# Patient Record
Sex: FEMALE | Race: WHITE | NOT HISPANIC OR LATINO | Employment: FULL TIME | URBAN - METROPOLITAN AREA
[De-identification: names, ages, dates, MRNs, and addresses within clinical notes are randomized per-mention and may not be internally consistent; named-entity substitution may affect disease eponyms.]

---

## 2019-10-07 ENCOUNTER — OFFICE VISIT (OUTPATIENT)
Dept: FAMILY MEDICINE CLINIC | Facility: CLINIC | Age: 59
End: 2019-10-07

## 2019-10-07 VITALS
HEIGHT: 64 IN | OXYGEN SATURATION: 97 % | BODY MASS INDEX: 50.02 KG/M2 | WEIGHT: 293 LBS | TEMPERATURE: 97.8 F | SYSTOLIC BLOOD PRESSURE: 120 MMHG | HEART RATE: 90 BPM | DIASTOLIC BLOOD PRESSURE: 84 MMHG | RESPIRATION RATE: 16 BRPM

## 2019-10-07 DIAGNOSIS — Z23 FLU VACCINE NEED: ICD-10-CM

## 2019-10-07 DIAGNOSIS — Z76.89 ENCOUNTER TO ESTABLISH CARE: Primary | ICD-10-CM

## 2019-10-07 DIAGNOSIS — Z00.00 HEALTHCARE MAINTENANCE: ICD-10-CM

## 2019-10-07 DIAGNOSIS — R73.9 HYPERGLYCEMIA: ICD-10-CM

## 2019-10-07 DIAGNOSIS — J30.9 ALLERGIC RHINITIS, UNSPECIFIED SEASONALITY, UNSPECIFIED TRIGGER: ICD-10-CM

## 2019-10-07 DIAGNOSIS — E66.01 MORBIDLY OBESE (HCC): ICD-10-CM

## 2019-10-07 DIAGNOSIS — E55.9 VITAMIN D DEFICIENCY: ICD-10-CM

## 2019-10-07 PROCEDURE — 90471 IMMUNIZATION ADMIN: CPT | Performed by: NURSE PRACTITIONER

## 2019-10-07 PROCEDURE — 90674 CCIIV4 VAC NO PRSV 0.5 ML IM: CPT | Performed by: NURSE PRACTITIONER

## 2019-10-07 PROCEDURE — 99204 OFFICE O/P NEW MOD 45 MIN: CPT | Performed by: NURSE PRACTITIONER

## 2019-10-07 RX ORDER — ERGOCALCIFEROL 1.25 MG/1
CAPSULE ORAL
Refills: 0 | COMMUNITY
Start: 2019-07-08 | End: 2019-10-08 | Stop reason: SDUPTHER

## 2019-10-07 NOTE — PATIENT INSTRUCTIONS
Heart-Healthy Eating Plan  Heart-healthy meal planning includes:  · Eating less unhealthy fats.  · Eating more healthy fats.  · Making other changes in your diet.  Talk with your doctor or a diet specialist (dietitian) to create an eating plan that is right for you.  What is my plan?  Your doctor may recommend an eating plan that includes:  · Total fat: ______% or less of total calories a day.  · Saturated fat: ______% or less of total calories a day.  · Cholesterol: less than _________mg a day.  What are tips for following this plan?  Cooking  Avoid frying your food. Try to bake, boil, grill, or broil it instead. You can also reduce fat by:  · Removing the skin from poultry.  · Removing all visible fats from meats.  · Steaming vegetables in water or broth.  Meal planning    · At meals, divide your plate into four equal parts:  ? Fill one-half of your plate with vegetables and green salads.  ? Fill one-fourth of your plate with whole grains.  ? Fill one-fourth of your plate with lean protein foods.  · Eat 4-5 servings of vegetables per day. A serving of vegetables is:  ? 1 cup of raw or cooked vegetables.  ? 2 cups of raw leafy greens.  · Eat 4-5 servings of fruit per day. A serving of fruit is:  ? 1 medium whole fruit.  ? ¼ cup of dried fruit.  ? ½ cup of fresh, frozen, or canned fruit.  ? ½ cup of 100% fruit juice.  · Eat more foods that have soluble fiber. These are apples, broccoli, carrots, beans, peas, and barley. Try to get 20-30 g of fiber per day.  · Eat 4-5 servings of nuts, legumes, and seeds per week:  ? 1 serving of dried beans or legumes equals ½ cup after being cooked.  ? 1 serving of nuts is ¼ cup.  ? 1 serving of seeds equals 1 tablespoon.  General information  · Eat more home-cooked food. Eat less restaurant, buffet, and fast food.  · Limit or avoid alcohol.  · Limit foods that are high in starch and sugar.  · Avoid fried foods.  · Lose weight if you are overweight.  · Keep track of how much salt  (sodium) you eat. This is important if you have high blood pressure. Ask your doctor to tell you more about this.  · Try to add vegetarian meals each week.  Fats  · Choose healthy fats. These include olive oil and canola oil, flaxseeds, walnuts, almonds, and seeds.  · Eat more omega-3 fats. These include salmon, mackerel, sardines, tuna, flaxseed oil, and ground flaxseeds. Try to eat fish at least 2 times each week.  · Check food labels. Avoid foods with trans fats or high amounts of saturated fat.  · Limit saturated fats.  ? These are often found in animal products, such as meats, butter, and cream.  ? These are also found in plant foods, such as palm oil, palm kernel oil, and coconut oil.  · Avoid foods with partially hydrogenated oils in them. These have trans fats. Examples are stick margarine, some tub margarines, cookies, crackers, and other baked goods.  What foods can I eat?  Fruits  All fresh, canned (in natural juice), or frozen fruits.  Vegetables  Fresh or frozen vegetables (raw, steamed, roasted, or grilled). Green salads.  Grains  Most grains. Choose whole wheat and whole grains most of the time. Rice and pasta, including brown rice and pastas made with whole wheat.  Meats and other proteins  Lean, well-trimmed beef, veal, pork, and lamb. Chicken and turkey without skin. All fish and shellfish. Wild duck, rabbit, pheasant, and venison. Egg whites or low-cholesterol egg substitutes. Dried beans, peas, lentils, and tofu. Seeds and most nuts.  Dairy  Low-fat or nonfat cheeses, including ricotta and mozzarella. Skim or 1% milk that is liquid, powdered, or evaporated. Buttermilk that is made with low-fat milk. Nonfat or low-fat yogurt.  Fats and oils  Non-hydrogenated (trans-free) margarines. Vegetable oils, including soybean, sesame, sunflower, olive, peanut, safflower, corn, canola, and cottonseed. Salad dressings or mayonnaise made with a vegetable oil.  Beverages  Mineral water. Coffee and tea. Diet  carbonated beverages.  Sweets and desserts  Sherbet, gelatin, and fruit ice. Small amounts of dark chocolate.  Limit all sweets and desserts.  Seasonings and condiments  All seasonings and condiments.  The items listed above may not be a complete list of foods and drinks you can eat. Contact a dietitian for more options.  What foods should I avoid?  Fruits  Canned fruit in heavy syrup. Fruit in cream or butter sauce. Fried fruit. Limit coconut.  Vegetables  Vegetables cooked in cheese, cream, or butter sauce. Fried vegetables.  Grains  Breads that are made with saturated or trans fats, oils, or whole milk. Croissants. Sweet rolls. Donuts. High-fat crackers, such as cheese crackers.  Meats and other proteins  Fatty meats, such as hot dogs, ribs, sausage, herring, rib-eye roast or steak. High-fat deli meats, such as salami and bologna. Caviar. Domestic duck and goose. Organ meats, such as liver.  Dairy  Cream, sour cream, cream cheese, and creamed cottage cheese. Whole-milk cheeses. Whole or 2% milk that is liquid, evaporated, or condensed. Whole buttermilk. Cream sauce or high-fat cheese sauce. Yogurt that is made from whole milk.  Fats and oils  Meat fat, or shortening. Cocoa butter, hydrogenated oils, palm oil, coconut oil, palm kernel oil. Solid fats and shortenings, including herring fat, salt pork, lard, and butter. Nondairy cream substitutes. Salad dressings with cheese or sour cream.  Beverages  Regular sodas and juice drinks with added sugar.  Sweets and desserts  Frosting. Pudding. Cookies. Cakes. Pies. Milk chocolate or white chocolate. Buttered syrups. Full-fat ice cream or ice cream drinks.  The items listed above may not be a complete list of foods and drinks to avoid. Contact a dietitian for more information.  Summary  · Heart-healthy meal planning includes eating less unhealthy fats, eating more healthy fats, and making other changes in your diet.  · Eat a balanced diet. This includes fruits and  vegetables, low-fat or nonfat dairy, lean protein, nuts and legumes, whole grains, and heart-healthy oils and fats.  This information is not intended to replace advice given to you by your health care provider. Make sure you discuss any questions you have with your health care provider.  Document Released: 06/18/2013 Document Revised: 01/25/2019 Document Reviewed: 01/25/2019  [employer] Patient Education © 2019 [employer]

## 2019-10-07 NOTE — PROGRESS NOTES
Patient ID: Melissa Hood is a 58 y.o. female     Subjective     Chief Complaint   Patient presents with   • Establish Care   • Cough       History of Present Illness    Melissa Hood presents to the office today for new patient evaluation.  Recently moved here in June from [place].Transferred here for work with [employer].  Has had complete annual physical in January of this year which was stable.  Vitamin D level was 20.7 in January.  Took 12 week course of vitamin D 50,000 units weekly.  Has had a chronic dry cough.  She was seen at the urgent care beginning of September and treated with antibiotics and steroids and condition had improved except for continued nagging dry cough.  Feels related to sinuses.  Colonoscopy completed in 2019 which was normal.  Hx of colon polyps with recommendations to repeat every 5 years.  Last menstrual period was in May 2009.  Pelvic exam with Pap test was completed in 2016 which was negative.  Last mammogram completed in November 2018 which was negative.  Bone density scan completed in January 2019 which showed osteopenia in the lumbar spine and bilateral hips.  She is had a treadmill stress test which was negative.  She had a CT scan of chest and abdomen which incidentally found a 1.7 cm left thyroid nodule.  Recommended thyroid ultrasound.  Previous thyroid testing was normal.  She denies any other new problems or concerns.    She denies any complaints of fever, chills, chest pain, shortness of air, abdominal pain, nausea, or any other concerns.     The following portions of the patient's history were reviewed and updated as appropriate: allergies, current medications, past family history, past medical history, past social history, past surgical history and problem list.       Review of Systems   Constitution: Negative.   HENT: Negative.    Eyes: Negative.    Cardiovascular: Negative.    Respiratory: Positive for cough.    Endocrine: Negative.    Hematologic/Lymphatic:  Negative.    Skin: Negative.    Musculoskeletal: Negative.    Gastrointestinal: Negative.    Genitourinary: Negative.    Neurological: Negative.    Psychiatric/Behavioral: Negative.        Vitals:    10/07/19 1310   BP: 120/84   Pulse: 90   Resp: 16   Temp: 97.8 °F (36.6 °C)   SpO2: 97%       Documented weights    10/07/19 1310   Weight: (!) 139 kg (307 lb)     Body mass index is 52.7 kg/m².    No results found for this or any previous visit.    Objective     Physical Exam   Constitutional: She is oriented to person, place, and time. Vital signs are normal. She appears well-developed.   Morbidly obese with BMI 52.7.   HENT:   Head: Normocephalic and atraumatic.   Right Ear: Tympanic membrane normal.   Left Ear: Tympanic membrane normal.   Mouth/Throat: Oropharynx is clear and moist.   Eyes: EOM are normal. Pupils are equal, round, and reactive to light.   Neck: Normal range of motion. Neck supple.   Cardiovascular: Normal rate, regular rhythm, normal heart sounds and intact distal pulses.   No murmur heard.  Pulmonary/Chest: Effort normal and breath sounds normal. She has no wheezes. She has no rhonchi. She has no rales.   Abdominal: Soft. Bowel sounds are normal. There is no hepatosplenomegaly. There is no tenderness.   Exam limited due to obesity   Musculoskeletal: Normal range of motion. She exhibits edema (dependent edema.  Slight increase swelling of left lower leg which is chronic.  ). She exhibits no tenderness.   No redness or tenderness.     Neurological: She is alert and oriented to person, place, and time. She has normal strength.   Skin: Skin is warm and dry. No rash noted. No cyanosis or erythema.   Psychiatric: She has a normal mood and affect. Her behavior is normal.       Assessment/Plan     Assessment/Plan     Melissa was seen today for establish care and cough.    Diagnoses and all orders for this visit:    Encounter to establish care    Flu vaccine need  -     Flucelvax Quad=>4Years  (PFS)    Vitamin D deficiency  -     Vitamin D 25 Hydroxy  -     Vitamin D 25 Hydroxy; Future    Allergic rhinitis, unspecified seasonality, unspecified trigger   Start OTC Claritin or Zyrtec.  Flonase nasal spray may also help.    Healthcare maintenance  -     CBC & Differential; Future  -     Comprehensive Metabolic Panel; Future  -     Lipid Panel; Future  -     TSH; Future  -     Vitamin D 25 Hydroxy; Future  -     Hemoglobin A1c; Future    Hyperglycemia  -     Comprehensive Metabolic Panel; Future  -     Hemoglobin A1c; Future    Morbidly obese (CMS/HCC)  -     Hemoglobin A1c; Future      Summary:  Melissa Hood presents office today to establish care with our office.  Recently moved to [place] from [place].  She denies any new problems or concerns except for dry cough.  This appears to be related to him about her mental allergies especially since recently moving to [place].  Instructed she would benefit from using over-the-counter antihistamine such as Claritin or Zyrtec daily to see if helps.  She may also try Flonase 2 sprays each nostril daily to see if helps.  Notify us if symptoms worsen or do not continue to improve.  Reviewed records from previous physical completed in January of the beginning of this year.  Appears all her labs are stable except her vitamin D level was low at 20.7.  Cholesterol levels were all in normal range.  Fasting glucose was elevated at 106.  Hemoglobin A1c was 5.70.  Liver function and kidney function tests were stable.  Thyroid function was normal.  Vitamin B12 was normal.  EKG completed in January 2019 showing sinus rhythm.  With occasional PAC.  She had previous CT scan of chest and abdomen which incidentally found a left thyroid nodule measuring 1.7 cm.  Instructed to follow-up with thyroid ultrasound.  Unsure if this was completed.  I will contact patient at home to see and if has not been done, will order for thyroid ultrasound.  Also DEXA scan did show osteopenia  which she needs to be taking calcium with vitamin D supplement.  She needs to return for Pap exam when able.  Instructed return in 1 year for next annual physical with fasting labs.    In the meantime, instructed to contact us sooner for any problems or concerns.    Patient Counseling:  --Nutrition: Stressed importance of moderation in sodium/caffeine intake, saturated fat and cholesterol.  Discussed caloric balance, sufficient intake of fresh fruits, vegetables, fiber, calcium, iron.  --Exercise: Stressed the importance of regular exercise.   --Substance Abuse: Discussed cessation/primary prevention of tobacco, alcohol, or other drug use; driving or other dangerous activities under the influence.    --Dental health: Discussed importance of regular tooth brushing, flossing, and dental visits.  -- suggested having eyes and vision checked if needed or past due.  --Immunizations reviewed.  --Discussed benefits of screening colonoscopy.    Tonya Altman, APRN  Family Medicine  [facility] [place] [facility]  10/07/19  1:18 PM

## 2019-10-08 DIAGNOSIS — E55.9 VITAMIN D DEFICIENCY: Primary | ICD-10-CM

## 2019-10-08 DIAGNOSIS — Z12.39 BREAST CANCER SCREENING: ICD-10-CM

## 2019-10-08 DIAGNOSIS — Z91.09 ENVIRONMENTAL ALLERGIES: ICD-10-CM

## 2019-10-08 LAB — 25(OH)D3+25(OH)D2 SERPL-MCNC: 19.9 NG/ML (ref 30–100)

## 2019-10-08 RX ORDER — FLUTICASONE PROPIONATE 50 MCG
2 SPRAY, SUSPENSION (ML) NASAL DAILY
Qty: 1 BOTTLE | Refills: 2 | Status: SHIPPED | OUTPATIENT
Start: 2019-10-08 | End: 2021-01-29

## 2019-10-08 RX ORDER — ERGOCALCIFEROL 1.25 MG/1
50000 CAPSULE ORAL
Qty: 12 CAPSULE | Refills: 0 | Status: SHIPPED | OUTPATIENT
Start: 2019-10-08 | End: 2019-12-25

## 2019-10-08 NOTE — PROGRESS NOTES
Spoke with patient concerning her vitamin D level and need to take prescription supplement weekly for next 12 weeks then switch to OTC supplement of 2000 units daily.

## 2019-11-14 ENCOUNTER — APPOINTMENT (OUTPATIENT)
Dept: MAMMOGRAPHY | Facility: HOSPITAL | Age: 59
End: 2019-11-14

## 2019-11-14 ENCOUNTER — PROCEDURE VISIT (OUTPATIENT)
Dept: FAMILY MEDICINE CLINIC | Facility: CLINIC | Age: 59
End: 2019-11-14

## 2019-11-14 VITALS
BODY MASS INDEX: 50.02 KG/M2 | RESPIRATION RATE: 16 BRPM | HEART RATE: 92 BPM | OXYGEN SATURATION: 96 % | TEMPERATURE: 98.5 F | DIASTOLIC BLOOD PRESSURE: 78 MMHG | WEIGHT: 293 LBS | SYSTOLIC BLOOD PRESSURE: 114 MMHG | HEIGHT: 64 IN

## 2019-11-14 DIAGNOSIS — Z12.11 SCREENING FOR COLON CANCER: ICD-10-CM

## 2019-11-14 DIAGNOSIS — Z12.4 SCREENING FOR CERVICAL CANCER: ICD-10-CM

## 2019-11-14 DIAGNOSIS — Z01.419 ROUTINE GYNECOLOGICAL EXAMINATION: Primary | ICD-10-CM

## 2019-11-14 LAB
DEVELOPER EXPIRATION DATE: NORMAL
DEVELOPER LOT NUMBER: NORMAL
EXPIRATION DATE: NORMAL
FECAL OCCULT BLOOD SCREEN, POC: NEGATIVE
Lab: NORMAL
NEGATIVE CONTROL: NEGATIVE
POSITIVE CONTROL: POSITIVE

## 2019-11-14 PROCEDURE — 99396 PREV VISIT EST AGE 40-64: CPT | Performed by: NURSE PRACTITIONER

## 2019-11-14 PROCEDURE — 82270 OCCULT BLOOD FECES: CPT | Performed by: NURSE PRACTITIONER

## 2019-11-14 NOTE — PROGRESS NOTES
Patient ID: Melissa Hood is a 59 y.o. female     Subjective     Chief Complaint   Patient presents with   • Gynecologic Exam       History of Present Illness    Melissa Hood presents to the office today for annual gynecological exam.  Labs Pap has been at least 5 years ago which was normal.  She is scheduled for screening mammogram today.  Denies any problems or concerns.    The following portions of the patient's history were reviewed and updated as appropriate: allergies, current medications, past family history, past medical history, past social history, past surgical history and problem list.       Review of Systems   Constitution: Negative.   HENT: Negative.    Eyes: Negative.    Cardiovascular: Negative.    Respiratory: Negative.    Endocrine: Negative.    Hematologic/Lymphatic: Negative.    Skin: Negative.    Musculoskeletal: Negative.    Gastrointestinal: Negative.    Genitourinary: Negative.    Neurological: Negative.    Psychiatric/Behavioral: Negative.        Vitals:    11/14/19 0832   BP: 114/78   Pulse: 92   Resp: 16   Temp: 98.5 °F (36.9 °C)   SpO2: 96%       Documented weights    11/14/19 0832   Weight: (!) 139 kg (307 lb)     Body mass index is 52.7 kg/m².    Results for orders placed or performed in visit on 11/14/19   POC Occult Blood Stool   Result Value Ref Range    Fecal Occult Blood Negative Negative    Lot Number [id number]     Expiration Date 10/31/2020     DEVELOPER LOT NUMBER [id number]     DEVELOPER EXPIRATION DATE 7/31/2020     Positive Control Positive Positive    Negative Control Negative Negative       Objective     Physical Exam   Constitutional: She appears well-developed and well-nourished. No distress.   Cardiovascular: Normal rate.   Pulmonary/Chest: Effort normal. Right breast exhibits no inverted nipple, no mass, no nipple discharge, no skin change and no tenderness. Left breast exhibits no inverted nipple, no mass, no nipple discharge, no skin change and no tenderness.    Genitourinary: Rectum normal, vagina normal and uterus normal. Rectal exam shows guaiac negative stool. There is no rash on the right labia. There is no rash on the left labia. Cervix exhibits no motion tenderness, no discharge and no friability. Right adnexum displays no mass, no tenderness and no fullness. Left adnexum displays no mass, no tenderness and no fullness. No erythema in the vagina.   Genitourinary Comments: Chaperoned per April Reaves MA          Assessment/Plan     Assessment/Plan     Melissa was seen today for gynecologic exam.    Diagnoses and all orders for this visit:    Routine gynecological examination    Screening for colon cancer  -     POC Occult Blood Stool    Screening for cervical cancer  -     Pap IG, Rfx HPV All Pth    Will call with results.  Notify us for any problems or concerns.        Tonya Altman, APRN  Family Medicine  [facility]  11/14/19  12:30 PM

## 2019-11-15 ENCOUNTER — HOSPITAL ENCOUNTER (OUTPATIENT)
Dept: MAMMOGRAPHY | Facility: HOSPITAL | Age: 59
Discharge: HOME | End: 2019-11-15
Admitting: NURSE PRACTITIONER

## 2019-11-15 DIAGNOSIS — Z12.39 BREAST CANCER SCREENING: ICD-10-CM

## 2019-11-15 PROCEDURE — 77067 SCR MAMMO BI INCL CAD: CPT

## 2019-11-18 LAB
CONV .: NORMAL
CYTOLOGIST CVX/VAG CYTO: NORMAL
CYTOLOGY CVX/VAG DOC CYTO: NORMAL
CYTOLOGY CVX/VAG DOC THIN PREP: NORMAL
DX ICD CODE: NORMAL
HIV 1 & 2 AB SER-IMP: NORMAL
OTHER STN SPEC: NORMAL
STAT OF ADQ CVX/VAG CYTO-IMP: NORMAL

## 2020-01-09 DIAGNOSIS — R73.9 HYPERGLYCEMIA: ICD-10-CM

## 2020-01-09 DIAGNOSIS — E55.9 VITAMIN D DEFICIENCY: ICD-10-CM

## 2020-01-09 DIAGNOSIS — E66.01 MORBIDLY OBESE (HCC): ICD-10-CM

## 2020-01-09 DIAGNOSIS — Z00.00 HEALTHCARE MAINTENANCE: ICD-10-CM

## 2020-01-10 LAB
25(OH)D3+25(OH)D2 SERPL-MCNC: 24.5 NG/ML (ref 30–100)
ALBUMIN SERPL-MCNC: 4 G/DL (ref 3.6–4.8)
ALBUMIN/GLOB SERPL: 1.7 {RATIO} (ref 1.2–2.2)
ALP SERPL-CCNC: 113 IU/L (ref 39–117)
ALT SERPL-CCNC: 16 IU/L (ref 0–32)
AST SERPL-CCNC: 19 IU/L (ref 0–40)
BASOPHILS # BLD AUTO: 0 X10E3/UL (ref 0–0.2)
BASOPHILS NFR BLD AUTO: 0 %
BILIRUB SERPL-MCNC: 0.2 MG/DL (ref 0–1.2)
BUN SERPL-MCNC: 12 MG/DL (ref 8–27)
BUN/CREAT SERPL: 21 (ref 12–28)
CALCIUM SERPL-MCNC: 8.9 MG/DL (ref 8.7–10.3)
CHLORIDE SERPL-SCNC: 106 MMOL/L (ref 96–106)
CHOLEST SERPL-MCNC: 162 MG/DL (ref 100–199)
CO2 SERPL-SCNC: 23 MMOL/L (ref 20–29)
CREAT SERPL-MCNC: 0.58 MG/DL (ref 0.57–1)
EOSINOPHIL # BLD AUTO: 0.1 X10E3/UL (ref 0–0.4)
EOSINOPHIL NFR BLD AUTO: 2 %
ERYTHROCYTE [DISTWIDTH] IN BLOOD BY AUTOMATED COUNT: 15.8 % (ref 11.7–15.4)
GLOBULIN SER CALC-MCNC: 2.3 G/DL (ref 1.5–4.5)
GLUCOSE SERPL-MCNC: 104 MG/DL (ref 65–99)
HBA1C MFR BLD: 5.9 % (ref 4.8–5.6)
HCT VFR BLD AUTO: 36.1 % (ref 34–46.6)
HDLC SERPL-MCNC: 53 MG/DL
HGB BLD-MCNC: 11.2 G/DL (ref 11.1–15.9)
IMM GRANULOCYTES # BLD AUTO: 0 X10E3/UL (ref 0–0.1)
IMM GRANULOCYTES NFR BLD AUTO: 0 %
LDLC SERPL CALC-MCNC: 94 MG/DL (ref 0–99)
LYMPHOCYTES # BLD AUTO: 1.3 X10E3/UL (ref 0.7–3.1)
LYMPHOCYTES NFR BLD AUTO: 21 %
MCH RBC QN AUTO: 23.3 PG (ref 26.6–33)
MCHC RBC AUTO-ENTMCNC: 31 G/DL (ref 31.5–35.7)
MCV RBC AUTO: 75 FL (ref 79–97)
MONOCYTES # BLD AUTO: 0.4 X10E3/UL (ref 0.1–0.9)
MONOCYTES NFR BLD AUTO: 7 %
NEUTROPHILS # BLD AUTO: 4.1 X10E3/UL (ref 1.4–7)
NEUTROPHILS NFR BLD AUTO: 70 %
PLATELET # BLD AUTO: 302 X10E3/UL (ref 150–450)
POTASSIUM SERPL-SCNC: 4.7 MMOL/L (ref 3.5–5.2)
PROT SERPL-MCNC: 6.3 G/DL (ref 6–8.5)
RBC # BLD AUTO: 4.8 X10E6/UL (ref 3.77–5.28)
SODIUM SERPL-SCNC: 142 MMOL/L (ref 134–144)
TRIGL SERPL-MCNC: 74 MG/DL (ref 0–149)
TSH SERPL DL<=0.005 MIU/L-ACNC: 2.92 UIU/ML (ref 0.45–4.5)
VLDLC SERPL CALC-MCNC: 15 MG/DL (ref 5–40)
WBC # BLD AUTO: 5.9 X10E3/UL (ref 3.4–10.8)

## 2020-01-11 LAB
FERRITIN SERPL-MCNC: 8 NG/ML (ref 15–150)
IRON SATN MFR SERPL: 7 % (ref 15–55)
IRON SERPL-MCNC: 31 UG/DL (ref 27–159)
TIBC SERPL-MCNC: 434 UG/DL (ref 250–450)
UIBC SERPL-MCNC: 403 UG/DL (ref 131–425)

## 2020-01-16 ENCOUNTER — OFFICE VISIT (OUTPATIENT)
Dept: FAMILY MEDICINE CLINIC | Facility: CLINIC | Age: 60
End: 2020-01-16

## 2020-01-16 VITALS
OXYGEN SATURATION: 95 % | TEMPERATURE: 98.3 F | RESPIRATION RATE: 16 BRPM | HEIGHT: 64 IN | DIASTOLIC BLOOD PRESSURE: 82 MMHG | SYSTOLIC BLOOD PRESSURE: 112 MMHG | BODY MASS INDEX: 50.02 KG/M2 | HEART RATE: 76 BPM | WEIGHT: 293 LBS

## 2020-01-16 DIAGNOSIS — E66.01 MORBIDLY OBESE (HCC): ICD-10-CM

## 2020-01-16 DIAGNOSIS — D50.9 IRON DEFICIENCY ANEMIA, UNSPECIFIED IRON DEFICIENCY ANEMIA TYPE: ICD-10-CM

## 2020-01-16 DIAGNOSIS — Z12.39 BREAST CANCER SCREENING: ICD-10-CM

## 2020-01-16 DIAGNOSIS — Z00.00 ANNUAL PHYSICAL EXAM: Primary | ICD-10-CM

## 2020-01-16 DIAGNOSIS — R73.03 PREDIABETES: ICD-10-CM

## 2020-01-16 DIAGNOSIS — E55.9 VITAMIN D DEFICIENCY: ICD-10-CM

## 2020-01-16 PROCEDURE — 99396 PREV VISIT EST AGE 40-64: CPT | Performed by: NURSE PRACTITIONER

## 2020-01-16 RX ORDER — MELATONIN
2000 DAILY
COMMUNITY
End: 2020-11-28

## 2020-01-16 RX ORDER — ERGOCALCIFEROL 1.25 MG/1
50000 CAPSULE ORAL
COMMUNITY
End: 2020-01-16

## 2020-01-16 NOTE — PROGRESS NOTES
Chief Complaint   Patient presents with   • Vitamin D Deficiency   • Annual Exam       Subjective   Melissa Hood is a 59 y.o. female and is here for a yearly physical exam. The patient reports no problems.    Do you take any herbs or supplements that were not prescribed by a doctor? no. If so, these will be added to active medication list.    Health Habits:  Dental Exam: Up to date  Eye Exam: Up to date  Diet:  Started Weight Watchers and is down 3.8 pounds.    Exercise: 2-3 days a week  Current exercise activities include: Walking  Pap:  11/14/2019 and was negative  Mammogram: 11/15/19 and was negative.    Dexa: N/A  Colonoscopy: 3/1/2019 - next one due 2024    The following portions of the patient's history were reviewed and updated as appropriate: allergies, current medications, past family history, past medical history, past social history, past surgical history and problem list.    Social and Family and Surgical History reviewed and updated today, see Rooming tab.    Health History, Preventive Measures and Vaccination flow sheets reviewed and updated today.    Patient's current medical chart in Epic; including previous office notes, imaging, labs, specialist's evaluation either in notes or in Media tab reviewed today.    Other pertinent medical information also reviewed thru Care Everywhere function is also reviewed today.    Review of Systems  Review of Systems   Constitutional: Negative.    HENT: Negative.    Respiratory: Negative.    Cardiovascular: Negative.    Gastrointestinal: Negative.    Endocrine: Negative.    Genitourinary: Negative.    Musculoskeletal: Negative.    Skin: Negative.    Neurological: Negative.    Hematological: Negative.    Psychiatric/Behavioral: Negative.      A comprehensive review of systems was negative.    Vitals:    01/16/20 0907   BP: 112/82   BP Location: Left arm   Patient Position: Sitting   Cuff Size: Large Adult   Pulse: 76   Resp: 16   Temp: 98.3 °F (36.8 °C)   SpO2: 95%  "  Weight: (!) 141 kg (310 lb)   Height: 162.6 cm (64\")       General Appearance:  Alert, cooperative, no distress, appears stated age, Morbid obesity with BMI of 53.2.   Head:  Normocephalic, without obvious abnormality, atraumatic   Eyes:  PERRL, conjunctiva/corneas clear, EOM's intact.   Ears:  Normal TM's and external ear canals, both ears   Nose: Nares normal, septum midline, mucosa normal, no drainage or sinus tenderness   Throat: Lips, mucosa, and tongue normal; teeth and gums normal   Neck: Supple, symmetrical, trachea midline, no adenopathy;   thyroid: No enlargement/tenderness/nodules   Back:  Symmetric, no curvature, ROM normal, no CVA tenderness   Lungs:  Clear to auscultation bilaterally, respirations even and unlabored   Chest wall:  No tenderness or deformity   Heart:  Regular rate and rhythm, S1 and S2 normal, no murmur, rub or gallop   Abdomen:  Soft, non-tender, bowel sounds active all four quadrants           Extremities: Extremities normal, atraumatic, no cyanosis or edema   Pulses: 2+ and symmetric all extremities   Skin: Skin color, texture, turgor normal, no rashes or lesions   Lymph nodes: Cervical, supraclavicular, and axillary nodes normal   Neurologic: CNII-XII intact. Normal strength, sensation and reflexes   throughout          Results for orders placed or performed in visit on 01/09/20   Hemoglobin A1c   Result Value Ref Range    Hemoglobin A1C 5.9 (H) 4.8 - 5.6 %   Vitamin D 25 Hydroxy   Result Value Ref Range    25 Hydroxy, Vitamin D 24.5 (L) 30.0 - 100.0 ng/mL   TSH   Result Value Ref Range    TSH 2.920 0.450 - 4.500 uIU/mL   Lipid Panel   Result Value Ref Range    Total Cholesterol 162 100 - 199 mg/dL    Triglycerides 74 0 - 149 mg/dL    HDL Cholesterol 53 >39 mg/dL    VLDL Cholesterol 15 5 - 40 mg/dL    LDL Cholesterol  94 0 - 99 mg/dL   Comprehensive Metabolic Panel   Result Value Ref Range    Glucose 104 (H) 65 - 99 mg/dL    BUN 12 8 - 27 mg/dL    Creatinine 0.58 0.57 - 1.00 mg/dL "    eGFR Non African Am 101 >59 mL/min/1.73    eGFR African Am 116 >59 mL/min/1.73    BUN/Creatinine Ratio 21 12 - 28    Sodium 142 134 - 144 mmol/L    Potassium 4.7 3.5 - 5.2 mmol/L    Chloride 106 96 - 106 mmol/L    Total CO2 23 20 - 29 mmol/L    Calcium 8.9 8.7 - 10.3 mg/dL    Total Protein 6.3 6.0 - 8.5 g/dL    Albumin 4.0 3.6 - 4.8 g/dL    Globulin 2.3 1.5 - 4.5 g/dL    A/G Ratio 1.7 1.2 - 2.2    Total Bilirubin 0.2 0.0 - 1.2 mg/dL    Alkaline Phosphatase 113 39 - 117 IU/L    AST (SGOT) 19 0 - 40 IU/L    ALT (SGPT) 16 0 - 32 IU/L   Iron Profile   Result Value Ref Range    TIBC 434 250 - 450 ug/dL    UIBC 403 131 - 425 ug/dL    Iron 31 27 - 159 ug/dL    Iron Saturation 7 (L) 15 - 55 %   Ferritin   Result Value Ref Range    Ferritin 8 (L) 15 - 150 ng/mL   CBC & Differential   Result Value Ref Range    WBC 5.9 3.4 - 10.8 x10E3/uL    RBC 4.80 3.77 - 5.28 x10E6/uL    Hemoglobin 11.2 11.1 - 15.9 g/dL    Hematocrit 36.1 34.0 - 46.6 %    MCV 75 (L) 79 - 97 fL    MCH 23.3 (L) 26.6 - 33.0 pg    MCHC 31.0 (L) 31.5 - 35.7 g/dL    RDW 15.8 (H) 11.7 - 15.4 %    Platelets 302 150 - 450 x10E3/uL    Neutrophil Rel % 70 Not Estab. %    Lymphocyte Rel % 21 Not Estab. %    Monocyte Rel % 7 Not Estab. %    Eosinophil Rel % 2 Not Estab. %    Basophil Rel % 0 Not Estab. %    Neutrophils Absolute 4.1 1.4 - 7.0 x10E3/uL    Lymphocytes Absolute 1.3 0.7 - 3.1 x10E3/uL    Monocytes Absolute 0.4 0.1 - 0.9 x10E3/uL    Eosinophils Absolute 0.1 0.0 - 0.4 x10E3/uL    Basophils Absolute 0.0 0.0 - 0.2 x10E3/uL    Immature Granulocyte Rel % 0 Not Estab. %    Immature Grans Absolute 0.0 0.0 - 0.1 x10E3/uL       Summary:  Melissa Hood has been doing well since she was last seen.  She denies any new problems or concerns.  Reviewed recent labs along with patient.  Her vitamin D level has improved since previous.  She may finish the prescription strength vitamin D until gone and then instructed to start over-the-counter vitamin D supplement 2000  units daily.  Hyperglycemia was noted at 104.  Added hemoglobin A1c.  This was elevated to the prediabetes range of 5.9.  Encouraged to continue diet and incorporate exercise into daily routine.  Congratulated on recent weight loss of 3 pounds with weight watchers.  She goes to the meetings once a week on Tuesdays.  Also noted low ferritin level however hemoglobin currently stable.  She states she has had prior history of iron deficiency in the past.  Instructed to start taking over-the-counter iron supplement daily.  All other labs were stable.  She is due for screening mammogram.  We will have her return in 1 year for next annual physical with fasting labs.  In the meantime instructed contact us sooner for any problems or concerns.    Assessment/Plan   Healthy female exam.  Melissa was seen today for vitamin d deficiency and annual exam.    Diagnoses and all orders for this visit:    Breast cancer screening  -     Mammo Screening Bilateral With CAD; Future    Annual physical exam  -     CBC & Differential; Future  -     Comprehensive Metabolic Panel; Future  -     Lipid Panel; Future  -     TSH; Future  -     Vitamin D 25 Hydroxy; Future  -     Iron and TIBC; Future  -     Ferritin; Future  -     Hemoglobin A1c; Future    Prediabetes  -     Comprehensive Metabolic Panel; Future  -     Hemoglobin A1c; Future    Vitamin D deficiency  -     Vitamin D 25 Hydroxy; Future    Morbidly obese (CMS/HCC)    Iron deficiency anemia, unspecified iron deficiency anemia type  -     CBC & Differential; Future  -     Iron and TIBC; Future  -     Ferritin; Future      1. Healthy adult  2. Patient Counseling:  --Nutrition: Stressed importance of moderation in sodium/caffeine intake,      saturated fat and cholesterol.  Discussed caloric balance, sufficient intake of fresh fruits, vegetables, fiber, calcium, iron.  --Discussed the new recommendation against daily use of baby aspirin for primary prevention in low risk  patients.  --Exercise: Stressed the importance of regular exercise.   --Substance Abuse: Discussed cessation/primary prevention of tobacco, alcohol,   or other drug use; driving or other dangerous activities under the influence.    --Dental health: Discussed importance of regular tooth brushing, flossing, and       dental visits.  --Suggested having eyes and vision checked if needed or past due.  --Immunizations reviewed.  --Discussed benefits of screening colonoscopy.  3. Discussed the patient's BMI with her.  The BMI is above average; BMI management plan is completed  4. Follow up next physical in 1 year    There are no Patient Instructions on file for this visit.    Medications Discontinued During This Encounter   Medication Reason   • albuterol sulfate  (90 Base) MCG/ACT inhaler *Therapy completed   • vitamin D (ERGOCALCIFEROL) 1.25 MG (27003 UT) capsule capsule *Therapy completed          MEREDITH Kimble  Family Practice  [facility] [place]

## 2020-05-11 ENCOUNTER — TELEPHONE (OUTPATIENT)
Dept: FAMILY MEDICINE CLINIC | Facility: CLINIC | Age: 60
End: 2020-05-11

## 2020-05-12 ENCOUNTER — OFFICE VISIT (OUTPATIENT)
Dept: FAMILY MEDICINE CLINIC | Facility: CLINIC | Age: 60
End: 2020-05-12

## 2020-05-12 VITALS
BODY MASS INDEX: 50.02 KG/M2 | HEART RATE: 97 BPM | DIASTOLIC BLOOD PRESSURE: 64 MMHG | WEIGHT: 293 LBS | SYSTOLIC BLOOD PRESSURE: 110 MMHG | HEIGHT: 64 IN | OXYGEN SATURATION: 97 % | TEMPERATURE: 97.3 F

## 2020-05-12 DIAGNOSIS — R10.9 RIGHT FLANK PAIN: ICD-10-CM

## 2020-05-12 DIAGNOSIS — M54.50 ACUTE LOW BACK PAIN WITHOUT SCIATICA, UNSPECIFIED BACK PAIN LATERALITY: Primary | ICD-10-CM

## 2020-05-12 LAB
BILIRUB BLD-MCNC: NEGATIVE MG/DL
CLARITY, POC: CLEAR
COLOR UR: YELLOW
GLUCOSE UR STRIP-MCNC: NEGATIVE MG/DL
KETONES UR QL: NEGATIVE
LEUKOCYTE EST, POC: NEGATIVE
NITRITE UR-MCNC: NEGATIVE MG/ML
PH UR: 7 [PH] (ref 5–8)
PROT UR STRIP-MCNC: NEGATIVE MG/DL
RBC # UR STRIP: NEGATIVE /UL
SP GR UR: 1.01 (ref 1–1.03)
UROBILINOGEN UR QL: NORMAL

## 2020-05-12 PROCEDURE — 81002 URINALYSIS NONAUTO W/O SCOPE: CPT | Performed by: FAMILY MEDICINE

## 2020-05-12 PROCEDURE — 99213 OFFICE O/P EST LOW 20 MIN: CPT | Performed by: FAMILY MEDICINE

## 2020-05-12 NOTE — PROGRESS NOTES
Chief Complaint   Patient presents with   • Back Pain       Low Back Pain: Patient complains of chronic low back pain. This is evaluated as a non injury. The patient first noted symptoms 1month ago. It was not related to no known injury. The pain is rated moderate, and is located at the right lumbar area. The pain is described as aching and occurs intermittently. The symptoms has been progressive. Symptoms are exacerbated by deep breathing and twisting. Factors which relieve the pain include NSAIDs. Other associated symptoms include no other symptoms. Previous history of symptoms: never.   Treatment efforts have included rest and OTC NSAIDS, with relief.  New right CVA area pain for 1 month  No radiation  Bowels and bladder ok  No rash        Objective   General:  Alert , no distress  HEENT:  WNL  Heart: RR , no murmur  Vascular: good pulses in legs/feet  Lungs: clear, good breath sounds in left lower area.  Back: mild decrease in ROM.  Pain to palp right CVA area  Neuro: Gait is normal, reflexes normal.  Skin: no rash.    Results for orders placed or performed in visit on 05/12/20   POC Urinalysis Dipstick   Result Value Ref Range    Color Yellow Yellow, Straw, Dark Yellow, Rebeca    Clarity, UA Clear Clear    Glucose, UA Negative Negative, 1000 mg/dL (3+) mg/dL    Bilirubin Negative Negative    Ketones, UA Negative Negative    Specific Gravity  1.010 1.005 - 1.030    Blood, UA Negative Negative    pH, Urine 7.0 5.0 - 8.0    Protein, POC Negative Negative mg/dL    Urobilinogen, UA Normal Normal    Leukocytes Negative Negative    Nitrite, UA Negative Negative         Assessment/Plan   Melissa was seen today for back pain.    Diagnoses and all orders for this visit:    Acute low back pain without sciatica, unspecified back pain laterality  -     POC Urinalysis Dipstick    Right flank pain  -     US Renal Bilateral; Future      Urine is clear.    There are no discontinued medications.     There are no Patient  Instructions on file for this visit.        We reviewed home treatments for back pain  No heavy lifting  Nightly back stretches  OTC medications: Tylenol, Advil, Aleve, IcyHot Patches  Hot soaks in tub.    Dr. Ozzy Spaulding MD  [place], [place]  [facility].

## 2020-05-13 ENCOUNTER — HOSPITAL ENCOUNTER (OUTPATIENT)
Dept: ULTRASOUND IMAGING | Facility: HOSPITAL | Age: 60
Discharge: HOME | End: 2020-05-13
Admitting: FAMILY MEDICINE

## 2020-05-13 DIAGNOSIS — R10.9 RIGHT FLANK PAIN: ICD-10-CM

## 2020-05-13 PROCEDURE — 76775 US EXAM ABDO BACK WALL LIM: CPT

## 2020-10-05 ENCOUNTER — FLU SHOT (OUTPATIENT)
Dept: FAMILY MEDICINE CLINIC | Facility: CLINIC | Age: 60
End: 2020-10-05

## 2020-10-05 DIAGNOSIS — Z23 NEED FOR INFLUENZA VACCINATION: ICD-10-CM

## 2020-10-05 PROCEDURE — 90471 IMMUNIZATION ADMIN: CPT | Performed by: NURSE PRACTITIONER

## 2020-10-05 PROCEDURE — 90686 IIV4 VACC NO PRSV 0.5 ML IM: CPT | Performed by: NURSE PRACTITIONER

## 2020-12-04 ENCOUNTER — APPOINTMENT (OUTPATIENT)
Dept: MAMMOGRAPHY | Facility: HOSPITAL | Age: 60
End: 2020-12-04

## 2021-01-08 ENCOUNTER — APPOINTMENT (OUTPATIENT)
Dept: MAMMOGRAPHY | Facility: HOSPITAL | Age: 61
End: 2021-01-08

## 2021-01-15 ENCOUNTER — HOSPITAL ENCOUNTER (OUTPATIENT)
Dept: MAMMOGRAPHY | Facility: HOSPITAL | Age: 61
Discharge: HOME | End: 2021-01-15
Admitting: NURSE PRACTITIONER

## 2021-01-15 DIAGNOSIS — Z12.39 BREAST CANCER SCREENING: ICD-10-CM

## 2021-01-15 PROCEDURE — 77067 SCR MAMMO BI INCL CAD: CPT

## 2021-01-15 PROCEDURE — 77063 BREAST TOMOSYNTHESIS BI: CPT

## 2021-01-29 ENCOUNTER — OFFICE VISIT (OUTPATIENT)
Dept: FAMILY MEDICINE CLINIC | Facility: CLINIC | Age: 61
End: 2021-01-29

## 2021-01-29 VITALS
HEART RATE: 80 BPM | HEIGHT: 64 IN | WEIGHT: 291 LBS | BODY MASS INDEX: 49.68 KG/M2 | TEMPERATURE: 97.6 F | DIASTOLIC BLOOD PRESSURE: 60 MMHG | OXYGEN SATURATION: 97 % | SYSTOLIC BLOOD PRESSURE: 110 MMHG | RESPIRATION RATE: 16 BRPM

## 2021-01-29 DIAGNOSIS — D50.9 IRON DEFICIENCY ANEMIA, UNSPECIFIED IRON DEFICIENCY ANEMIA TYPE: ICD-10-CM

## 2021-01-29 DIAGNOSIS — E55.9 VITAMIN D DEFICIENCY: ICD-10-CM

## 2021-01-29 DIAGNOSIS — E66.01 MORBIDLY OBESE (HCC): ICD-10-CM

## 2021-01-29 DIAGNOSIS — R73.03 PREDIABETES: ICD-10-CM

## 2021-01-29 DIAGNOSIS — Z00.00 ANNUAL PHYSICAL EXAM: Primary | ICD-10-CM

## 2021-01-29 PROCEDURE — 99396 PREV VISIT EST AGE 40-64: CPT | Performed by: NURSE PRACTITIONER

## 2021-01-29 NOTE — PROGRESS NOTES
"Chief Complaint   Patient presents with   • Annual Exam       Patient Care Team:  Head, MEREDITH Soliman as PCP - General (Nurse Practitioner)    Subjective   Melissa Hood is a 60 y.o. female and is here for a yearly physical exam. The patient reports no problems.    Do you take any herbs or supplements that were not prescribed by a doctor? no. If so, these will be added to active medication list.    Health Habits:  Dental Exam: Up to date  Eye Exam: Up to date  Diet: Weight Watchers diet  Exercise: [employer]  Current exercise activities include: Sit eliptical  Pap:  11/14/2019   Mammogram: 1/15/2021  Dexa: 2 years ago    Colonoscopy: Due 3/1/2024    The following portions of the patient's history were reviewed and updated as appropriate: allergies, current medications, past family history, past medical history, past social history, past surgical history and problem list.    Social and Family and Surgical History reviewed and updated today, see Rooming tab.    Health History, Preventive Measures and Vaccination flow sheets reviewed and updated today.    Patient's current medical chart in Epic; including previous office notes, imaging, labs, specialist's evaluation either in notes or in Media tab reviewed today.    Other pertinent medical information also reviewed thru Care Everywhere function is also reviewed today.    Review of Systems  Review of Systems   Constitutional: Negative.    HENT: Negative.    Respiratory: Negative.    Cardiovascular: Negative.    Gastrointestinal: Negative.    Endocrine: Negative.    Genitourinary: Negative.    Musculoskeletal: Negative.    Skin: Negative.    Neurological: Negative.    Hematological: Negative.    Psychiatric/Behavioral: Negative.      Vitals:    01/29/21 0824   BP: 110/60   BP Location: Left arm   Patient Position: Sitting   Cuff Size: Large Adult   Pulse: 80   Resp: 16   Temp: 97.6 °F (36.4 °C)   SpO2: 97%   Weight: 132 kg (291 lb)   Height: 162.6 cm (64\")     9 pound weight " loss since last year.      General Appearance:  Alert, cooperative, no distress, appears stated age, morbidly obese with BMI of 49.95   Head:  Normocephalic, without obvious abnormality, atraumatic   Eyes:  PERRL, conjunctiva/corneas clear, EOM's intact.   Ears:  Normal TM's and external ear canals, both ears   Nose: Nares normal, septum midline, mucosa normal, no drainage or sinus tenderness   Throat: Lips, mucosa, and tongue normal; teeth and gums normal   Neck: Supple, symmetrical, trachea midline, no adenopathy;   thyroid: No enlargement/tenderness/nodules       Lungs:  Clear to auscultation bilaterally, respirations even and unlabored   Chest wall:  No tenderness or deformity   Heart:  Regular rate and rhythm, S1 and S2 normal, no murmur, rub or gallop   Abdomen:  Soft, non-tender           Extremities: Extremities normal, atraumatic, no cyanosis.  Slight edema left ankle which is chronic.  Previous left knee surgery.     Pulses: 2+ and symmetric all extremities   Skin: Skin color, texture, turgor normal, no rashes or lesions   Lymph nodes: Cervical, supraclavicular, and axillary nodes normal   Neurologic: CNII-XII intact. Normal strength, sensation and reflexes   throughout          Results for orders placed or performed in visit on 01/17/21   Comprehensive Metabolic Panel    Specimen: Blood   Result Value Ref Range    Glucose 101 (H) 65 - 99 mg/dL    BUN 12 8 - 23 mg/dL    Creatinine 0.54 (L) 0.57 - 1.00 mg/dL    eGFR Non African Am 115 >60 mL/min/1.73    eGFR African Am 139 >60 mL/min/1.73    BUN/Creatinine Ratio 22.2 7.0 - 25.0    Sodium 141 136 - 145 mmol/L    Potassium 4.5 3.5 - 5.2 mmol/L    Chloride 105 98 - 107 mmol/L    Total CO2 26.7 22.0 - 29.0 mmol/L    Calcium 8.6 8.6 - 10.5 mg/dL    Total Protein 6.2 6.0 - 8.5 g/dL    Albumin 3.80 3.50 - 5.20 g/dL    Globulin 2.4 gm/dL    A/G Ratio 1.6 g/dL    Total Bilirubin 0.3 0.0 - 1.2 mg/dL    Alkaline Phosphatase 108 39 - 117 U/L    AST (SGOT) 18 1 - 32 U/L     ALT (SGPT) 14 1 - 33 U/L   Lipid Panel    Specimen: Blood   Result Value Ref Range    Total Cholesterol 153 0 - 200 mg/dL    Triglycerides 71 0 - 150 mg/dL    HDL Cholesterol 52 40 - 60 mg/dL    VLDL Cholesterol Sergio 14 5 - 40 mg/dL    LDL Chol Calc (NIH) 87 0 - 100 mg/dL   TSH    Specimen: Blood   Result Value Ref Range    TSH 4.040 0.270 - 4.200 uIU/mL   Vitamin D 25 Hydroxy    Specimen: Blood   Result Value Ref Range    25 Hydroxy, Vitamin D 24.9 (L) 30.0 - 100.0 ng/ml   Iron and TIBC    Specimen: Blood   Result Value Ref Range    TIBC 505 mcg/dL    UIBC 475 (H) 112 - 346 mcg/dL    Iron 30 (L) 37 - 145 mcg/dL    Iron Saturation 6 (L) 20 - 50 %   Ferritin    Specimen: Blood   Result Value Ref Range    Ferritin 7.28 (L) 13.00 - 150.00 ng/mL   Hemoglobin A1c   Result Value Ref Range    Hemoglobin A1C 5.80 (H) 4.80 - 5.60 %   Please Note   Result Value Ref Range    Please note Comment    Written Authorization   Result Value Ref Range    Written Authorization Comment    CBC & Differential    Specimen: Blood   Result Value Ref Range    WBC 4.95 3.40 - 10.80 10*3/mm3    RBC 4.83 3.77 - 5.28 10*6/mm3    Hemoglobin 11.2 (L) 12.0 - 15.9 g/dL    Hematocrit 36.7 34.0 - 46.6 %    MCV 76.0 (L) 79.0 - 97.0 fL    MCH 23.2 (L) 26.6 - 33.0 pg    MCHC 30.5 (L) 31.5 - 35.7 g/dL    RDW 16.3 (H) 12.3 - 15.4 %    Platelets 292 140 - 450 10*3/mm3    Neutrophil Rel % 61.6 42.7 - 76.0 %    Lymphocyte Rel % 25.5 19.6 - 45.3 %    Monocyte Rel % 10.5 5.0 - 12.0 %    Eosinophil Rel % 1.6 0.3 - 6.2 %    Basophil Rel % 0.6 0.0 - 1.5 %    Neutrophils Absolute 3.05 1.70 - 7.00 10*3/mm3    Lymphocytes Absolute 1.26 0.70 - 3.10 10*3/mm3    Monocytes Absolute 0.52 0.10 - 0.90 10*3/mm3    Eosinophils Absolute 0.08 0.00 - 0.40 10*3/mm3    Basophils Absolute 0.03 0.00 - 0.20 10*3/mm3    Immature Granulocyte Rel % 0.2 0.0 - 0.5 %    Immature Grans Absolute 0.01 0.00 - 0.05 10*3/mm3    nRBC 0.0 0.0 - 0.2 /100 WBC     Assessment/Plan   Healthy female  exam.  Diagnoses and all orders for this visit:    1. Annual physical exam (Primary)  -     CBC & Differential; Future  -     Comprehensive Metabolic Panel; Future  -     Lipid Panel; Future  -     TSH Rfx On Abnormal To Free T4; Future  -     UA / M With / Rflx Culture([facility] ONLY) - Urine, Clean Catch; Future  -     Vitamin D 25 Hydroxy; Future  -     Hemoglobin A1c; Future  -     Iron and TIBC; Future  -     Ferritin; Future    2. Iron deficiency anemia, unspecified iron deficiency anemia type  -     CBC & Differential; Future  -     Iron and TIBC; Future  -     Ferritin; Future    3. Vitamin D deficiency  -     Vitamin D 25 Hydroxy; Future    4. Prediabetes  -     Comprehensive Metabolic Panel; Future  -     Hemoglobin A1c; Future    5. Morbidly obese (CMS/HCC)  -     CBC & Differential; Future  -     Comprehensive Metabolic Panel; Future  -     Lipid Panel; Future  -     TSH Rfx On Abnormal To Free T4; Future  -     Hemoglobin A1c; Future      1. Melissa Hood has been doing well since she was last seen.  Reviewed recent labs along with patient and noted low iron and vitamin D.  Advised to take over-the-counter vitamin D supplement 2000 units daily along with ferrous sulfate 325 mg daily.  She wishes to hold off on the Dexa scan until next year.  Up-to-date all other testing.  We will have her return in 1 year for next annual physical with fasting labs.  In the meantime instructed contact us sooner for any problems or concerns.  2. Patient Counseling:  --Nutrition: Stressed importance of moderation in sodium/caffeine intake,      saturated fat and cholesterol.  Discussed caloric balance, sufficient intake of fresh fruits, vegetables, fiber, calcium, iron.  --Discussed the new recommendation against daily use of baby aspirin for primary prevention in low risk patients.  --Exercise: Stressed the importance of regular exercise.   --Substance Abuse: Discussed cessation/primary prevention of tobacco, alcohol,   or  other drug use; driving or other dangerous activities under the influence.    --Dental health: Discussed importance of regular tooth brushing, flossing, and       dental visits.  --Suggested having eyes and vision checked if needed or past due.  --Immunizations reviewed.  --Discussed benefits of screening colonoscopy.  3. Discussed the patient's BMI with her.  The BMI is above average; BMI management plan is completed  4. Follow up next physical in 1 year    There are no Patient Instructions on file for this visit.    Medications Discontinued During This Encounter   Medication Reason   • fluticasone (FLONASE) 50 MCG/ACT nasal spray *Therapy completed          Patient was wearing facemask when I entered the room and throughout our encounter. Protective equipment was worn throughout this patient encounter including a face mask and gloves. Hand hygiene was performed before donning protective equipment and after removal when leaving the room.     MEREDITH Kimble  Family Practice  [facility] [place]

## 2021-03-07 ENCOUNTER — IMMUNIZATION (OUTPATIENT)
Dept: VACCINE CLINIC | Facility: HOSPITAL | Age: 61
End: 2021-03-07

## 2021-03-07 PROCEDURE — 0001A: CPT | Performed by: INTERNAL MEDICINE

## 2021-03-07 PROCEDURE — 91300 HC SARSCOV02 VAC 30MCG/0.3ML IM: CPT | Performed by: INTERNAL MEDICINE

## 2021-03-28 ENCOUNTER — IMMUNIZATION (OUTPATIENT)
Dept: VACCINE CLINIC | Facility: HOSPITAL | Age: 61
End: 2021-03-28

## 2021-03-28 PROCEDURE — 91300 HC SARSCOV02 VAC 30MCG/0.3ML IM: CPT | Performed by: INTERNAL MEDICINE

## 2021-03-28 PROCEDURE — 0002A: CPT | Performed by: INTERNAL MEDICINE

## 2021-10-13 DIAGNOSIS — E55.9 VITAMIN D DEFICIENCY: ICD-10-CM

## 2021-10-13 DIAGNOSIS — R73.03 PREDIABETES: ICD-10-CM

## 2021-10-13 DIAGNOSIS — Z00.00 ANNUAL PHYSICAL EXAM: ICD-10-CM

## 2021-10-13 DIAGNOSIS — D50.9 IRON DEFICIENCY ANEMIA, UNSPECIFIED IRON DEFICIENCY ANEMIA TYPE: ICD-10-CM

## 2021-10-13 DIAGNOSIS — E66.01 MORBIDLY OBESE (HCC): ICD-10-CM

## 2021-10-23 LAB
25(OH)D3+25(OH)D2 SERPL-MCNC: 24.6 NG/ML (ref 30–100)
ALBUMIN SERPL-MCNC: 3.7 G/DL (ref 3.8–4.8)
ALBUMIN/GLOB SERPL: 1.5 {RATIO} (ref 1.2–2.2)
ALP SERPL-CCNC: 107 IU/L (ref 44–121)
ALT SERPL-CCNC: 14 IU/L (ref 0–32)
APPEARANCE UR: CLEAR
AST SERPL-CCNC: 16 IU/L (ref 0–40)
BACTERIA #/AREA URNS HPF: NORMAL /[HPF]
BASOPHILS # BLD AUTO: 0 X10E3/UL (ref 0–0.2)
BASOPHILS NFR BLD AUTO: 1 %
BILIRUB SERPL-MCNC: 0.4 MG/DL (ref 0–1.2)
BILIRUB UR QL STRIP: NEGATIVE
BUN SERPL-MCNC: 11 MG/DL (ref 8–27)
BUN/CREAT SERPL: 19 (ref 12–28)
CALCIUM SERPL-MCNC: 9 MG/DL (ref 8.7–10.3)
CASTS URNS QL MICRO: NORMAL /LPF
CHLORIDE SERPL-SCNC: 102 MMOL/L (ref 96–106)
CHOLEST SERPL-MCNC: 177 MG/DL (ref 100–199)
CO2 SERPL-SCNC: 24 MMOL/L (ref 20–29)
COLOR UR: YELLOW
CREAT SERPL-MCNC: 0.59 MG/DL (ref 0.57–1)
EOSINOPHIL # BLD AUTO: 0.1 X10E3/UL (ref 0–0.4)
EOSINOPHIL NFR BLD AUTO: 2 %
EPI CELLS #/AREA URNS HPF: NORMAL /HPF (ref 0–10)
ERYTHROCYTE [DISTWIDTH] IN BLOOD BY AUTOMATED COUNT: 13.2 % (ref 11.7–15.4)
FERRITIN SERPL-MCNC: 27 NG/ML (ref 15–150)
GLOBULIN SER CALC-MCNC: 2.5 G/DL (ref 1.5–4.5)
GLUCOSE SERPL-MCNC: 99 MG/DL (ref 65–99)
GLUCOSE UR QL: NEGATIVE
HBA1C MFR BLD: 5.7 % (ref 4.8–5.6)
HCT VFR BLD AUTO: 40.9 % (ref 34–46.6)
HDLC SERPL-MCNC: 57 MG/DL
HGB BLD-MCNC: 13.4 G/DL (ref 11.1–15.9)
HGB UR QL STRIP: NEGATIVE
IMM GRANULOCYTES # BLD AUTO: 0 X10E3/UL (ref 0–0.1)
IMM GRANULOCYTES NFR BLD AUTO: 0 %
IRON SATN MFR SERPL: 15 % (ref 15–55)
IRON SERPL-MCNC: 53 UG/DL (ref 27–139)
KETONES UR QL STRIP: NEGATIVE
LDLC SERPL CALC-MCNC: 104 MG/DL (ref 0–99)
LEUKOCYTE ESTERASE UR QL STRIP: NEGATIVE
LYMPHOCYTES # BLD AUTO: 1.3 X10E3/UL (ref 0.7–3.1)
LYMPHOCYTES NFR BLD AUTO: 22 %
MCH RBC QN AUTO: 28 PG (ref 26.6–33)
MCHC RBC AUTO-ENTMCNC: 32.8 G/DL (ref 31.5–35.7)
MCV RBC AUTO: 85 FL (ref 79–97)
MICRO URNS: NORMAL
MICRO URNS: NORMAL
MONOCYTES # BLD AUTO: 0.5 X10E3/UL (ref 0.1–0.9)
MONOCYTES NFR BLD AUTO: 9 %
NEUTROPHILS # BLD AUTO: 3.9 X10E3/UL (ref 1.4–7)
NEUTROPHILS NFR BLD AUTO: 66 %
NITRITE UR QL STRIP: NEGATIVE
PH UR STRIP: 6.5 [PH] (ref 5–7.5)
PLATELET # BLD AUTO: 292 X10E3/UL (ref 150–450)
POTASSIUM SERPL-SCNC: 4.4 MMOL/L (ref 3.5–5.2)
PROT SERPL-MCNC: 6.2 G/DL (ref 6–8.5)
PROT UR QL STRIP: NEGATIVE
RBC # BLD AUTO: 4.79 X10E6/UL (ref 3.77–5.28)
RBC #/AREA URNS HPF: NORMAL /HPF (ref 0–2)
SODIUM SERPL-SCNC: 140 MMOL/L (ref 134–144)
SP GR UR: 1 (ref 1–1.03)
TIBC SERPL-MCNC: 347 UG/DL (ref 250–450)
TRIGL SERPL-MCNC: 89 MG/DL (ref 0–149)
TSH SERPL DL<=0.005 MIU/L-ACNC: 2.57 UIU/ML (ref 0.45–4.5)
UIBC SERPL-MCNC: 294 UG/DL (ref 118–369)
URINALYSIS REFLEX: NORMAL
UROBILINOGEN UR STRIP-MCNC: 0.2 MG/DL (ref 0.2–1)
VLDLC SERPL CALC-MCNC: 16 MG/DL (ref 5–40)
WBC # BLD AUTO: 5.8 X10E3/UL (ref 3.4–10.8)
WBC #/AREA URNS HPF: NORMAL /HPF (ref 0–5)

## 2021-10-29 ENCOUNTER — OFFICE VISIT (OUTPATIENT)
Dept: FAMILY MEDICINE CLINIC | Facility: CLINIC | Age: 61
End: 2021-10-29

## 2021-10-29 VITALS
OXYGEN SATURATION: 96 % | HEIGHT: 64 IN | DIASTOLIC BLOOD PRESSURE: 74 MMHG | TEMPERATURE: 97.8 F | BODY MASS INDEX: 49.51 KG/M2 | SYSTOLIC BLOOD PRESSURE: 110 MMHG | WEIGHT: 290 LBS | HEART RATE: 76 BPM | RESPIRATION RATE: 18 BRPM

## 2021-10-29 DIAGNOSIS — R73.03 PREDIABETES: ICD-10-CM

## 2021-10-29 DIAGNOSIS — E66.01 MORBIDLY OBESE (HCC): ICD-10-CM

## 2021-10-29 DIAGNOSIS — E55.9 VITAMIN D DEFICIENCY: ICD-10-CM

## 2021-10-29 DIAGNOSIS — E78.00 ELEVATED LDL CHOLESTEROL LEVEL: ICD-10-CM

## 2021-10-29 DIAGNOSIS — Z00.00 ANNUAL PHYSICAL EXAM: Primary | ICD-10-CM

## 2021-10-29 DIAGNOSIS — Z23 IMMUNIZATION DUE: ICD-10-CM

## 2021-10-29 DIAGNOSIS — D50.9 IRON DEFICIENCY ANEMIA, UNSPECIFIED IRON DEFICIENCY ANEMIA TYPE: ICD-10-CM

## 2021-10-29 PROCEDURE — 90471 IMMUNIZATION ADMIN: CPT | Performed by: NURSE PRACTITIONER

## 2021-10-29 PROCEDURE — 90686 IIV4 VACC NO PRSV 0.5 ML IM: CPT | Performed by: NURSE PRACTITIONER

## 2021-10-29 PROCEDURE — 99396 PREV VISIT EST AGE 40-64: CPT | Performed by: NURSE PRACTITIONER

## 2021-10-29 RX ORDER — FERROUS SULFATE 325(65) MG
325 TABLET ORAL
COMMUNITY

## 2021-10-29 NOTE — PATIENT INSTRUCTIONS

## 2021-10-29 NOTE — PROGRESS NOTES
"Chief Complaint   Patient presents with   • Annual Exam       Patient Care Team:  Head, MEREDITH Soliman as PCP - General (Nurse Practitioner)    Subjective   Melissa Hood is a 61 y.o. female and is here for a yearly physical exam. The patient reports no problems.    Do you take any herbs or supplements that were not prescribed by a doctor? no. If so, these will be added to active medication list.    Health Habits:  Dental Exam: Up to date  Eye Exam: Up to date  Diet: WW diet  Exercise: 3 times per week   Current exercise activities include: [employer] bike  Pap:  UTD  Mammogram: 1/15/2021 negative   Dexa: Due in 2 years.    Colonoscopy: Due in 2024.     The following portions of the patient's history were reviewed and updated as appropriate: allergies, current medications, past family history, past medical history, past social history, past surgical history and problem list.    Social and Family and Surgical History reviewed and updated today, see Rooming tab.    Health History, Preventive Measures and Vaccination flow sheets reviewed and updated today.    Patient's current medical chart in Epic; including previous office notes, imaging, labs, specialist's evaluation either in notes or in Media tab reviewed today.    Other pertinent medical information also reviewed thru Care Everywhere function is also reviewed today.    Review of Systems  Review of Systems   Constitutional: Negative.    HENT: Negative.    Respiratory: Negative.    Cardiovascular: Negative.    Gastrointestinal: Negative.    Endocrine: Negative.    Genitourinary: Negative.    Musculoskeletal: Negative.    Skin: Negative.    Neurological: Negative.    Hematological: Negative.    Psychiatric/Behavioral: Negative.      Vitals:    10/29/21 1306   BP: 110/74   BP Location: Left arm   Patient Position: Sitting   Cuff Size: Large Adult   Pulse: 76   Resp: 18   Temp: 97.8 °F (36.6 °C)   SpO2: 96%   Weight: 132 kg (290 lb)   Height: 162.6 cm (64\")       General " Appearance:  Alert, cooperative, no distress, appears stated age   Head:  Normocephalic, without obvious abnormality, atraumatic   Eyes:  PERRL, conjunctiva/corneas clear, EOM's intact.   Ears:  Normal TM's and external ear canals, both ears   Nose: Nares normal, septum midline, mucosa normal, no drainage or sinus tenderness   Throat: Lips, mucosa, and tongue normal; teeth and gums normal   Neck: Supple, symmetrical, trachea midline, no adenopathy;   thyroid: No enlargement/tenderness/nodules       Lungs:  Clear to auscultation bilaterally, respirations even and unlabored   Chest wall:  No tenderness or deformity   Heart:  Regular rate and rhythm, S1 and S2 normal, no murmur, rub or gallop   Abdomen:  Soft, non-tender, exam, exam limited due to obesity           Extremities: Extremities normal, atraumatic, no cyanosis or edema   Pulses: 2+ and symmetric all extremities   Skin: Skin color, texture, turgor normal, no rashes or lesions   Lymph nodes: Cervical, supraclavicular, and axillary nodes normal   Neurologic: CNII-XII intact. Normal strength, sensation and reflexes   throughout     Patient's (Body mass index is 49.78 kg/m².) indicates that they are morbidly obese (BMI > 40 or > 35 with obesity - related health condition) with health related conditions that include dyslipidemias and prediabetes . Weight is unchanged. BMI is is above average; BMI management plan is completed. We discussed portion control, increasing exercise and Weight Watchers or other Commercial based weight reduction program.       Results for orders placed or performed in visit on 10/13/21   Ferritin    Specimen: Blood   Result Value Ref Range    Ferritin 27 15 - 150 ng/mL   Iron and TIBC    Specimen: Blood   Result Value Ref Range    TIBC 347 250 - 450 ug/dL    UIBC 294 118 - 369 ug/dL    Iron 53 27 - 139 ug/dL    Iron Saturation 15 15 - 55 %   Hemoglobin A1c    Specimen: Blood   Result Value Ref Range    Hemoglobin A1C 5.7 (H) 4.8 - 5.6 %    Vitamin D 25 Hydroxy    Specimen: Blood   Result Value Ref Range    25 Hydroxy, Vitamin D 24.6 (L) 30.0 - 100.0 ng/mL   UA / M With / Rflx Culture([facility] ONLY) - Urine, Clean Catch    Specimen: Urine, Clean Catch   Result Value Ref Range    Specific Gravity, UA 1.005 1.005 - 1.030    pH, UA 6.5 5.0 - 7.5    Color, UA Yellow Yellow    Appearance, UA Clear Clear    Leukocytes, UA Negative Negative    Protein Negative Negative/Trace    Glucose, UA Negative Negative    Ketones Negative Negative    Blood, UA Negative Negative    Bilirubin, UA Negative Negative    Urobilinogen, UA 0.2 0.2 - 1.0 mg/dL    Nitrite, UA Negative Negative    Microscopic Examination Comment     Microscopic Examination See below:     Urinalysis Reflex Comment    TSH Rfx On Abnormal To Free T4    Specimen: Blood   Result Value Ref Range    TSH 2.570 0.450 - 4.500 uIU/mL   Lipid Panel    Specimen: Blood   Result Value Ref Range    Total Cholesterol 177 100 - 199 mg/dL    Triglycerides 89 0 - 149 mg/dL    HDL Cholesterol 57 >39 mg/dL    VLDL Cholesterol Sergio 16 5 - 40 mg/dL    LDL Chol Calc (NIH) 104 (H) 0 - 99 mg/dL   Comprehensive Metabolic Panel    Specimen: Blood   Result Value Ref Range    Glucose 99 65 - 99 mg/dL    BUN 11 8 - 27 mg/dL    Creatinine 0.59 0.57 - 1.00 mg/dL    eGFR Non African Am 99 >59 mL/min/1.73    eGFR African Am 114 >59 mL/min/1.73    BUN/Creatinine Ratio 19 12 - 28    Sodium 140 134 - 144 mmol/L    Potassium 4.4 3.5 - 5.2 mmol/L    Chloride 102 96 - 106 mmol/L    Total CO2 24 20 - 29 mmol/L    Calcium 9.0 8.7 - 10.3 mg/dL    Total Protein 6.2 6.0 - 8.5 g/dL    Albumin 3.7 (L) 3.8 - 4.8 g/dL    Globulin 2.5 1.5 - 4.5 g/dL    A/G Ratio 1.5 1.2 - 2.2    Total Bilirubin 0.4 0.0 - 1.2 mg/dL    Alkaline Phosphatase 107 44 - 121 IU/L    AST (SGOT) 16 0 - 40 IU/L    ALT (SGPT) 14 0 - 32 IU/L   Microscopic Examination -   Result Value Ref Range    WBC, UA None seen 0 - 5 /hpf    RBC, UA None seen 0 - 2 /hpf    Epithelial Cells  (non renal) None seen 0 - 10 /hpf    Casts None seen None seen /lpf    Bacteria, UA None seen None seen/Few   CBC & Differential    Specimen: Blood   Result Value Ref Range    WBC 5.8 3.4 - 10.8 x10E3/uL    RBC 4.79 3.77 - 5.28 x10E6/uL    Hemoglobin 13.4 11.1 - 15.9 g/dL    Hematocrit 40.9 34.0 - 46.6 %    MCV 85 79 - 97 fL    MCH 28.0 26.6 - 33.0 pg    MCHC 32.8 31.5 - 35.7 g/dL    RDW 13.2 11.7 - 15.4 %    Platelets 292 150 - 450 x10E3/uL    Neutrophil Rel % 66 Not Estab. %    Lymphocyte Rel % 22 Not Estab. %    Monocyte Rel % 9 Not Estab. %    Eosinophil Rel % 2 Not Estab. %    Basophil Rel % 1 Not Estab. %    Neutrophils Absolute 3.9 1.4 - 7.0 x10E3/uL    Lymphocytes Absolute 1.3 0.7 - 3.1 x10E3/uL    Monocytes Absolute 0.5 0.1 - 0.9 x10E3/uL    Eosinophils Absolute 0.1 0.0 - 0.4 x10E3/uL    Basophils Absolute 0.0 0.0 - 0.2 x10E3/uL    Immature Granulocyte Rel % 0 Not Estab. %    Immature Grans Absolute 0.0 0.0 - 0.1 x10E3/uL     Assessment/Plan   Healthy female exam.  Diagnoses and all orders for this visit:    1. Annual physical exam (Primary)  -     CBC & Differential; Future  -     Comprehensive Metabolic Panel; Future  -     Lipid Panel; Future  -     TSH; Future  -     Vitamin D 25 Hydroxy; Future  -     UA / M With / Rflx Culture([facility] ONLY) - Urine, Clean Catch; Future  -     Hemoglobin A1c; Future  -     Iron and TIBC; Future    2. Immunization due  -     FluLaval/Fluarix/Fluzone >6 Months ([id number])    3. Iron deficiency anemia, unspecified iron deficiency anemia type  -     CBC & Differential; Future  -     Iron and TIBC; Future    4. Vitamin D deficiency  -     Vitamin D 25 Hydroxy; Future    5. Prediabetes  -     Comprehensive Metabolic Panel; Future  -     Hemoglobin A1c; Future    6. Morbidly obese (HCC)  -     Vitamin D 25 Hydroxy; Future    7. Elevated LDL cholesterol level  -     Lipid Panel; Future      1. Melissa Hood has been doing well since she was last seen.  Reviewed recent labs  along with patient which all appear stable.  Iron levels look good.  Vitamin D level remains slightly low at 24.6.  Advised continue over-the-counter iron supplement along with vitamin D.  Blood pressure stable at 110/74.  Hemoglobin A1c improved from previous currently at 5.7.  Advised to in the prediabetes range.  Continue to watch diet limit sugar and carb intake.  She is currently following weight watchers.  She also exercises at the [employer].  We will have her return in 1 year for next annual physical with fasting labs.  In the meantime instructed contact us sooner for any problems or concerns.  Also refer to scanned physical form under media tab.  2. Patient Counseling:  --Nutrition: Stressed importance of moderation in sodium/caffeine intake,      saturated fat and cholesterol.  Discussed caloric balance, sufficient intake of fresh fruits, vegetables, fiber, calcium, iron.  --Exercise: Stressed the importance of regular exercise.   --Substance Abuse: Discussed cessation/primary prevention of tobacco, alcohol,   or other drug use; driving or other dangerous activities under the influence.    --Dental health: Discussed importance of regular tooth brushing, flossing, and       dental visits.  --Suggested having eyes and vision checked if needed or past due.  --Immunizations reviewed.  --Discussed benefits of screening colonoscopy.  3. Discussed the patient's BMI with her.  The BMI is above average; BMI management plan is completed  4. Follow up next physical in 1 year    Patient was given instructions and counseling regarding condition or for health maintenance advice.  Please see specific information pulled into the AVS if appropriate.      There are no discontinued medications.       Patient was wearing facemask when I entered the room and throughout our encounter. Protective equipment was worn throughout this patient encounter including a face mask and gloves. Hand hygiene was performed before donning protective  equipment and after removal when leaving the room.     Tonya Altman, APRN  Family Practice  [facility] [place] [facility]       20

## 2021-12-11 ENCOUNTER — IMMUNIZATION (OUTPATIENT)
Dept: VACCINE CLINIC | Facility: HOSPITAL | Age: 61
End: 2021-12-11

## 2021-12-11 PROCEDURE — 91300 HC SARSCOV02 VAC 30MCG/0.3ML IM: CPT | Performed by: INTERNAL MEDICINE

## 2021-12-11 PROCEDURE — 0004A HC ADM SARSCOV2 30MCG/0.3ML BOOSTER: CPT | Performed by: INTERNAL MEDICINE

## 2022-02-07 ENCOUNTER — TRANSCRIBE ORDERS (OUTPATIENT)
Dept: ADMINISTRATIVE | Facility: HOSPITAL | Age: 62
End: 2022-02-07

## 2022-02-07 DIAGNOSIS — Z12.31 VISIT FOR SCREENING MAMMOGRAM: Primary | ICD-10-CM

## 2022-02-11 ENCOUNTER — HOSPITAL ENCOUNTER (OUTPATIENT)
Dept: MAMMOGRAPHY | Facility: HOSPITAL | Age: 62
Discharge: HOME | End: 2022-02-11
Admitting: NURSE PRACTITIONER

## 2022-02-11 DIAGNOSIS — Z12.31 VISIT FOR SCREENING MAMMOGRAM: ICD-10-CM

## 2022-02-11 PROCEDURE — 77067 SCR MAMMO BI INCL CAD: CPT

## 2022-02-11 PROCEDURE — 77063 BREAST TOMOSYNTHESIS BI: CPT

## 2022-03-28 ENCOUNTER — OFFICE VISIT (OUTPATIENT)
Dept: FAMILY MEDICINE CLINIC | Facility: CLINIC | Age: 62
End: 2022-03-28

## 2022-03-28 VITALS
TEMPERATURE: 98.1 F | BODY MASS INDEX: 49.78 KG/M2 | HEIGHT: 64 IN | HEART RATE: 78 BPM | RESPIRATION RATE: 16 BRPM | OXYGEN SATURATION: 96 % | DIASTOLIC BLOOD PRESSURE: 74 MMHG | SYSTOLIC BLOOD PRESSURE: 120 MMHG

## 2022-03-28 DIAGNOSIS — J32.9 SINUSITIS, UNSPECIFIED CHRONICITY, UNSPECIFIED LOCATION: Primary | ICD-10-CM

## 2022-03-28 PROCEDURE — 96372 THER/PROPH/DIAG INJ SC/IM: CPT | Performed by: NURSE PRACTITIONER

## 2022-03-28 PROCEDURE — 99213 OFFICE O/P EST LOW 20 MIN: CPT | Performed by: NURSE PRACTITIONER

## 2022-03-28 RX ORDER — TRIAMCINOLONE ACETONIDE 40 MG/ML
40 INJECTION, SUSPENSION INTRA-ARTICULAR; INTRAMUSCULAR ONCE
Status: COMPLETED | OUTPATIENT
Start: 2022-03-28 | End: 2022-03-28

## 2022-03-28 RX ADMIN — TRIAMCINOLONE ACETONIDE 40 MG: 40 INJECTION, SUSPENSION INTRA-ARTICULAR; INTRAMUSCULAR at 11:51

## 2022-03-28 NOTE — PROGRESS NOTES
"Chief Complaint   Patient presents with   • Cough   • Allergies       Upper Respiratory Infection: Patient complains of possible sinusitis. Symptoms include congestion and cough. Onset of symptoms was 1 week ago, gradually worsening since that time. She also c/o congestion, no  fever, non productive cough and sneezing for the past 1 week .  She is drinking moderate amounts of fluids. Evaluation to date: none. Treatment to date: day and night cold med.  Ill contacts at home or school or work discussed.   seen for same complaints last week.      COVID-19 mRNA (PFR) (COVID-19 (PFIZER) PURPLE CAP)12/11/2021, 3/28/2021, 3/7/2021    The following portions of the patient's history were reviewed and updated as appropriate: allergies, current medications, past family history, past medical history, past social history, past surgical history and problem list.    Vitals:    03/28/22 1123   BP: 120/74   BP Location: Left arm   Patient Position: Sitting   Cuff Size: Large Adult   Pulse: 78   Resp: 16   Temp: 98.1 °F (36.7 °C)   SpO2: 96%   Height: 162.6 cm (64\")     Gen: Mildly ill appearing, alert  Ears: TM's bulging without redness  Nose:  Congestion  Throat:  Red without exudate, some drainage  Neck: No LAD  Lung: Good air movement, regular RR  Heart: RR without murmur  Skin: No rash      Assessment/Plan   Diagnoses and all orders for this visit:    1. Sinusitis, unspecified chronicity, unspecified location (Primary)  -     triamcinolone acetonide (KENALOG-40) injection 40 mg   Continue Flonase and antihistamine as directed.     ]  Tylenol or Advil as needed for pain, fever, muscle aches  Plenty of fluids  Hand washing discussed  Off work or school note given if needed.  Warm tea for throat.  Pros and cons of antibiotic use discussed. None given today.  Continue to monitor.  Usually improves with steroids.    Instructed to notify us if symptoms worsen or do not improve.        Patient was wearing face mask when I " entered the room and throughout our encounter. Protective equipment was worn throughout this patient encounter including a face mask, eye protection, and gloves. Hand hygiene was performed before donning protective equipment and after removal when leaving the room.     MEREDITH Kimble  Family Practice  [facility] [place] [facility]

## 2022-03-29 ENCOUNTER — TELEPHONE (OUTPATIENT)
Dept: FAMILY MEDICINE CLINIC | Facility: CLINIC | Age: 62
End: 2022-03-29

## 2022-03-29 RX ORDER — AMOXICILLIN 875 MG/1
875 TABLET, COATED ORAL 2 TIMES DAILY
Qty: 20 TABLET | Refills: 0 | Status: SHIPPED | OUTPATIENT
Start: 2022-03-29 | End: 2022-04-08

## 2022-03-29 RX ORDER — METHYLPREDNISOLONE 4 MG/1
TABLET ORAL
Qty: 21 EACH | Refills: 0 | Status: SHIPPED | OUTPATIENT
Start: 2022-03-29 | End: 2023-02-24 | Stop reason: SDUPTHER

## 2022-03-29 NOTE — TELEPHONE ENCOUNTER
Pt said she feels like she does need a antibiotic now, lot of increased discolored mucus.  Also she would like steroid called in like her  got when he was in if ok with you.

## 2022-03-29 NOTE — TELEPHONE ENCOUNTER
Caller: Melissa Hood    Relationship: Self    Best call back number: [phone]     What medication are you requesting: ANTIBIOTIC / STEROID     What are your current symptoms: COUGH / CONGESTION     How long have you been experiencing symptoms: 3 DAYS     Have you had these symptoms before:    [x] Yes  [] No    Have you been treated for these symptoms before:   [x] Yes  [] No    If a prescription is needed, what is your preferred pharmacy and phone number: [id number] [address] AT [facility] [phone] [facility] [phone]      Additional notes:PATIENT CALLING STATING SHE DIDN'T THINK SHE NEEDED ANYTHING CALLED IN YESTERDAY SHE IS COUGHING UP YELLOW MUCUS

## 2022-09-30 ENCOUNTER — HOSPITAL ENCOUNTER (EMERGENCY)
Facility: HOSPITAL | Age: 62
Discharge: HOME | End: 2022-09-30
Attending: EMERGENCY MEDICINE | Admitting: EMERGENCY MEDICINE

## 2022-09-30 ENCOUNTER — APPOINTMENT (OUTPATIENT)
Dept: GENERAL RADIOLOGY | Facility: HOSPITAL | Age: 62
End: 2022-09-30

## 2022-09-30 VITALS
HEART RATE: 64 BPM | WEIGHT: 290 LBS | BODY MASS INDEX: 51.38 KG/M2 | DIASTOLIC BLOOD PRESSURE: 102 MMHG | OXYGEN SATURATION: 100 % | RESPIRATION RATE: 17 BRPM | HEIGHT: 63 IN | TEMPERATURE: 98.8 F | SYSTOLIC BLOOD PRESSURE: 185 MMHG

## 2022-09-30 DIAGNOSIS — S42.214A CLOSED NONDISPLACED FRACTURE OF SURGICAL NECK OF RIGHT HUMERUS, UNSPECIFIED FRACTURE MORPHOLOGY, INITIAL ENCOUNTER: Primary | ICD-10-CM

## 2022-09-30 PROCEDURE — 99283 EMERGENCY DEPT VISIT LOW MDM: CPT

## 2022-09-30 PROCEDURE — 73030 X-RAY EXAM OF SHOULDER: CPT

## 2022-09-30 PROCEDURE — 73060 X-RAY EXAM OF HUMERUS: CPT

## 2022-09-30 PROCEDURE — 63710000001 ONDANSETRON ODT 4 MG TABLET DISPERSIBLE: Performed by: EMERGENCY MEDICINE

## 2022-09-30 RX ORDER — HYDROCODONE BITARTRATE AND ACETAMINOPHEN 5; 325 MG/1; MG/1
1 TABLET ORAL ONCE
Status: COMPLETED | OUTPATIENT
Start: 2022-09-30 | End: 2022-09-30

## 2022-09-30 RX ORDER — ONDANSETRON 4 MG/1
4 TABLET, ORALLY DISINTEGRATING ORAL EVERY 8 HOURS PRN
Qty: 20 TABLET | Refills: 0 | Status: SHIPPED | OUTPATIENT
Start: 2022-09-30 | End: 2022-10-07

## 2022-09-30 RX ORDER — ONDANSETRON 4 MG/1
4 TABLET, ORALLY DISINTEGRATING ORAL ONCE
Status: COMPLETED | OUTPATIENT
Start: 2022-09-30 | End: 2022-09-30

## 2022-09-30 RX ORDER — HYDROCODONE BITARTRATE AND ACETAMINOPHEN 5; 325 MG/1; MG/1
1 TABLET ORAL EVERY 8 HOURS PRN
Qty: 15 TABLET | Refills: 0 | Status: SHIPPED | OUTPATIENT
Start: 2022-09-30 | End: 2022-10-05

## 2022-09-30 RX ADMIN — ONDANSETRON 4 MG: 4 TABLET, ORALLY DISINTEGRATING ORAL at 21:33

## 2022-09-30 RX ADMIN — HYDROCODONE BITARTRATE AND ACETAMINOPHEN 1 TABLET: 5; 325 TABLET ORAL at 21:33

## 2022-10-01 NOTE — ED PROVIDER NOTES
EMERGENCY DEPARTMENT ENCOUNTER    Room Number:  08/08  Date seen:  9/30/2022  PCP: Tonya Altman APRN  Historian: Patient      HPI:  Chief Complaint: Fall, arm injury  A complete HPI/ROS/PMH/PSH/SH/FH are unobtainable due to: N/A  Context: Melissa Hood is a 61 y.o. female who presents to the ED c/o an accidental fall that caused her to land on her right arm this afternoon.  This resulted in immediate pain to the proximal right arm and shoulder area.  It hurts to move the arm in any direction.  She denies any other areas of injury or concern.      PAST MEDICAL HISTORY  Active Ambulatory Problems     Diagnosis Date Noted   • Vitamin D deficiency 10/07/2019   • Iron deficiency anemia 10/29/2021     Resolved Ambulatory Problems     Diagnosis Date Noted   • No Resolved Ambulatory Problems     Past Medical History:   Diagnosis Date   • Left thyroid nodule    • Osteopenia          PAST SURGICAL HISTORY  Past Surgical History:   Procedure Laterality Date   • CATARACT EXTRACTION, BILATERAL  2017   • GASTRIC BYPASS  2011   • REPLACEMENT TOTAL KNEE Left 2016   • TONSILLECTOMY           FAMILY HISTORY  Family History   Problem Relation Age of Onset   • Hypertension Mother    • Stroke Mother    • Diabetes Mother    • Breast cancer Mother    • No Known Problems Father    • Breast cancer Brother          SOCIAL HISTORY  Social History     Socioeconomic History   • Marital status:    Tobacco Use   • Smoking status: Never Smoker   • Smokeless tobacco: Never Used   Substance and Sexual Activity   • Alcohol use: No   • Drug use: No   • Sexual activity: Defer         ALLERGIES  Patient has no known allergies.        REVIEW OF SYSTEMS  Review of Systems   Constitutional: Negative for activity change and fever.   HENT: Negative.    Respiratory: Negative for cough and shortness of breath.    Cardiovascular: Negative for chest pain.   Gastrointestinal: Negative for abdominal pain.   Musculoskeletal: Positive for arthralgias and  myalgias.   Skin: Negative for color change and rash.   Neurological: Negative for syncope and headaches.   All other systems reviewed and are negative.         PHYSICAL EXAM  ED Triage Vitals [09/30/22 2006]   Temp Heart Rate Resp BP SpO2   98.8 °F (37.1 °C) 64 17 (!) 185/102 100 %      Temp src Heart Rate Source Patient Position BP Location FiO2 (%)   Oral Monitor -- Right arm --         GENERAL: Pleasant lady, sitting calmly in the wheelchair, appears uncomfortable but no acute distress  HENT: nares patent, normocephalic and atraumatic  EYES: no scleral icterus, EOMI  CV: regular rhythm, normal rate, normal distal pulses at the radial artery  RESPIRATORY: normal effort, no stridor  MUSCULOSKELETAL: No obvious deformity but there is moderate tenderness all throughout the proximal right humerus.  Patient is unable to abduct or externally rotate the shoulder joint because of pain on movement.  The elbow is nontender.  The wrist is nontender.  The hand is nontender.  Patient has completely normal fine motor skills of the distal right hand and fingers.  Distal sensation is normal and intact to all fingers as well.  NEURO: alert, moves all extremities, follows commands  PSYCH:  calm, cooperative  SKIN: warm, dry    Vital signs and nursing notes reviewed.          LAB RESULTS  No results found for this or any previous visit (from the past 24 hour(s)).    Ordered the above labs and reviewed the results.        RADIOLOGY  XR Shoulder 2+ View Right    Result Date: 9/30/2022  CR Shoulder Comp Min 2 Vws RT INDICATION: Right shoulder pain. Fell tonight COMPARISON: None available. FINDINGS: AP internal rotation and AP external rotation views of the right shoulder.  There is a complete minimally displaced fracture of the surgical neck of the humerus with subtle impaction deformity. There is also a complete oblique minimally displaced fracture of the greater tuberosity. This is better demonstrated on the dedicated humerus  series. Bony glenoid appears intact. Mild degenerative change of the AC joint. No distinct rib fracture.  The acromioclavicular and glenohumeral articulations are within normal limits.  No foreign body.     Fractures of the surgical neck and greater tuberosity of the humerus. Humerus series dictated separately. No additional fracture or secondary sign of shoulder dislocation. Scapular Y view not performed. Signer Name: Hunter Carreon MD  Signed: 9/30/2022 9:20 PM  Workstation Name: [email or web address]  [facility]    XR Humerus Right    Result Date: 9/30/2022  CR Humerus Min 2 Vws RT INDICATION: Right shoulder pain. Fell tonight COMPARISON: None available. FINDINGS: AP and lateral views of the right humerus.  There is a complete minimally displaced transversely oriented fracture of the humerus at the level of the surgical neck. Minimal impaction deformity. There is also a complete oblique minimally displaced fracture of the greater tuberosity. Bony glenoid appears intact.  No bone erosion or destruction.  Articulation at the shoulder and elbow is anatomic.  No foreign body.     1. Fracture deformities of the right humerus at the level of the surgical neck and greater tuberosity. Shoulder series dictated separately Signer Name: Hunter Carreon MD  Signed: 9/30/2022 9:19 PM  Workstation Name: [email or web address]  [facility]      Ordered the above noted radiological studies. Reviewed by me in PACS.            PROCEDURES  Procedures          MEDICATIONS GIVEN IN ER  Medications   HYDROcodone-acetaminophen (NORCO) 5-325 MG per tablet 1 tablet (1 tablet Oral Given 9/30/22 2133)   ondansetron ODT (ZOFRAN-ODT) disintegrating tablet 4 mg (4 mg Oral Given 9/30/22 2133)                   MEDICAL DECISION MAKING, PROGRESS, and CONSULTS    All labs have been independently reviewed by me.  All radiology studies have been reviewed by me and discussed with radiologist dictating the report.   EKG's  "independently viewed and interpreted by me.  Discussion below represents my analysis of pertinent findings related to patient's condition, differential diagnosis, treatment plan and final disposition.        ED Course as of 09/30/22 2134   Fri Sep 30, 2022   2134 I reviewed the x-ray which shows a fracture of the proximal humerus.  We will place the patient in a sling for immobilization and give her some pain medications here.  Plan to discharge home with pain medicines and nausea medicines.  Gave her resource information to follow-up in our orthopedics office early next week.  Discussed with her the usual \"return to ER\" instructions prior to discharge. [ROSALINO]      ED Course User Index  [ROSALINO] Hunter Concepcion MD                DIAGNOSIS  Final diagnoses:   Closed nondisplaced fracture of surgical neck of right humerus, unspecified fracture morphology, initial encounter         DISPOSITION  Home            Latest Documented Vital Signs:  As of 21:34 EDT  BP- (!) 185/102 HR- 64 Temp- 98.8 °F (37.1 °C) (Oral) O2 sat- 100%        --    Please note that portions of this were completed with a voice recognition program.          Hunter Concepcion MD  09/30/22 2134    "

## 2022-10-01 NOTE — ED NOTES
Trip and fall tonight landing on R shoulder tonight. No other injuries. Intense pain with movement. Full sensation to hand.

## 2022-10-01 NOTE — DISCHARGE INSTRUCTIONS
Please wear the sling and shoulder immobilizer that we placed on you until you are evaluated by the orthopedic specialist for further care.  Please return to the emergency room for any worsening pain, weakness, numbness or any other concerns.

## 2022-10-31 ENCOUNTER — FLU SHOT (OUTPATIENT)
Dept: FAMILY MEDICINE CLINIC | Facility: CLINIC | Age: 62
End: 2022-10-31

## 2022-10-31 DIAGNOSIS — Z23 NEED FOR INFLUENZA VACCINATION: Primary | ICD-10-CM

## 2022-10-31 PROCEDURE — 90471 IMMUNIZATION ADMIN: CPT | Performed by: NURSE PRACTITIONER

## 2022-10-31 PROCEDURE — 90686 IIV4 VACC NO PRSV 0.5 ML IM: CPT | Performed by: NURSE PRACTITIONER

## 2023-02-17 ENCOUNTER — TRANSCRIBE ORDERS (OUTPATIENT)
Dept: ADMINISTRATIVE | Facility: HOSPITAL | Age: 63
End: 2023-02-17
Payer: COMMERCIAL

## 2023-02-17 DIAGNOSIS — Z12.31 ENCOUNTER FOR SCREENING MAMMOGRAM FOR MALIGNANT NEOPLASM OF BREAST: Primary | ICD-10-CM

## 2023-02-23 NOTE — PROGRESS NOTES
"Subjective      Chief Complaint   Patient presents with   • Back Pain     Right side x month - hurts mainly at night        Melissa Hood is a 62 y.o. female who presents for evaluation of low back pain. The patient has had recurrent self limited episodes of low back pain in the past. Symptoms have been present for 1 month and are unchanged.  Onset was related to / precipitated by no known injury. The pain is located in the right sacroiliac area and does not radiate. The pain is described as aching and dull and occurs upon awakening and at night. She rates her pain as a 2 on a scale of 0-10.  Increases to 9 on 0-10 scale at night. Symptoms are exacerbated by lying down. Symptoms are improved by change in body position, heat, NSAIDs and rest. She has also tried nothing which provided no symptom relief. She has no other symptoms associated with the back pain. The patient has no \"red flag\" history indicative of complicated back pain.    Difficulty losing weight.  Tried Weight Watchers and limit calorie intake. Was on \"diet pills\" in the past which did not work for her.  Limited ability to exercise due to back pain and right knee pain.  She knows she will need surgery for right knee eventually however knows she needs to lose weight before able to do so.     The following portions of the patient's history were reviewed and updated as appropriate: allergies, current medications, past family history, past medical history, past social history, past surgical history and problem list.      Objective      Vitals:    02/24/23 0852   BP: 120/82   Pulse: 67   Temp: 97.7 °F (36.5 °C)   SpO2: 97%     Weight:  291 pounds.  BMI 51.55.  Previous weight: 290 pounds on 9/30/2022.      Inspection and palpation: inspection of back is normal, right sided thoracic back pain.  Muscle tone and ROM exam: muscle tone normal without spasm.    Brief Urine Lab Results  (Last result in the past 365 days)      Color   Clarity   Blood   Leuk Est   " Nitrite   Protein   CREAT   Urine HCG        02/24/23 0921 Yellow   Clear   Negative   Negative   Negative   Negative                 Assessment & Plan   Nonspecific acute low back pain       Diagnosis Plan   1. Right flank pain  POCT urinalysis dipstick, manual      2. Acute right-sided back pain, unspecified back location  ketorolac (TORADOL) injection 30 mg    triamcinolone acetonide (KENALOG-40) injection 40 mg    cyclobenzaprine (FLEXERIL) 10 MG tablet      3. Obesity, morbid, BMI 50 or higher (HCC)  Semaglutide-Weight Management (Wegovy) 0.25 MG/0.5ML solution auto-injector          Natural history and expected course discussed. Questions answered.  Short (2-4 day) period of relative rest recommended until acute symptoms improve.  Heat to affected area as needed for local pain relief.  Muscle relaxants per medication orders.  Follow-up in 2 weeks.prn.        Patient was wearing face mask when I entered the room and throughout our encounter. Protective equipment was worn throughout this patient encounter including a face mask.  Hand hygiene was performed before donning protective equipment and after removal when leaving the room.     Tonya Altman, APRN

## 2023-02-24 ENCOUNTER — HOSPITAL ENCOUNTER (OUTPATIENT)
Dept: MAMMOGRAPHY | Facility: HOSPITAL | Age: 63
Discharge: HOME | End: 2023-02-24
Admitting: NURSE PRACTITIONER
Payer: COMMERCIAL

## 2023-02-24 ENCOUNTER — OFFICE VISIT (OUTPATIENT)
Dept: FAMILY MEDICINE CLINIC | Facility: CLINIC | Age: 63
End: 2023-02-24
Payer: COMMERCIAL

## 2023-02-24 VITALS
HEIGHT: 63 IN | OXYGEN SATURATION: 97 % | DIASTOLIC BLOOD PRESSURE: 82 MMHG | WEIGHT: 291 LBS | TEMPERATURE: 97.7 F | BODY MASS INDEX: 51.56 KG/M2 | SYSTOLIC BLOOD PRESSURE: 120 MMHG | HEART RATE: 67 BPM

## 2023-02-24 DIAGNOSIS — Z12.31 ENCOUNTER FOR SCREENING MAMMOGRAM FOR MALIGNANT NEOPLASM OF BREAST: ICD-10-CM

## 2023-02-24 DIAGNOSIS — R10.9 RIGHT FLANK PAIN: Primary | ICD-10-CM

## 2023-02-24 DIAGNOSIS — M54.9 ACUTE RIGHT-SIDED BACK PAIN, UNSPECIFIED BACK LOCATION: ICD-10-CM

## 2023-02-24 DIAGNOSIS — E66.01 OBESITY, MORBID, BMI 50 OR HIGHER: ICD-10-CM

## 2023-02-24 LAB
BILIRUB BLD-MCNC: NEGATIVE MG/DL
CLARITY, POC: CLEAR
COLOR UR: YELLOW
GLUCOSE UR STRIP-MCNC: NEGATIVE MG/DL
KETONES UR QL: NEGATIVE
LEUKOCYTE EST, POC: NEGATIVE
NITRITE UR-MCNC: NEGATIVE MG/ML
PH UR: 5 [PH] (ref 5–8)
PROT UR STRIP-MCNC: NEGATIVE MG/DL
RBC # UR STRIP: NEGATIVE /UL
SP GR UR: 1 (ref 1–1.03)
UROBILINOGEN UR QL: NORMAL

## 2023-02-24 PROCEDURE — 96372 THER/PROPH/DIAG INJ SC/IM: CPT | Performed by: NURSE PRACTITIONER

## 2023-02-24 PROCEDURE — 77063 BREAST TOMOSYNTHESIS BI: CPT

## 2023-02-24 PROCEDURE — 99213 OFFICE O/P EST LOW 20 MIN: CPT | Performed by: NURSE PRACTITIONER

## 2023-02-24 PROCEDURE — 81002 URINALYSIS NONAUTO W/O SCOPE: CPT | Performed by: NURSE PRACTITIONER

## 2023-02-24 PROCEDURE — 77067 SCR MAMMO BI INCL CAD: CPT

## 2023-02-24 RX ORDER — SEMAGLUTIDE 0.25 MG/.5ML
0.25 INJECTION, SOLUTION SUBCUTANEOUS WEEKLY
Qty: 2 ML | Refills: 0 | Status: SHIPPED | OUTPATIENT
Start: 2023-02-24 | End: 2023-03-24 | Stop reason: DRUGHIGH

## 2023-02-24 RX ORDER — TRIAMCINOLONE ACETONIDE 40 MG/ML
40 INJECTION, SUSPENSION INTRA-ARTICULAR; INTRAMUSCULAR ONCE
Status: COMPLETED | OUTPATIENT
Start: 2023-02-24 | End: 2023-02-24

## 2023-02-24 RX ORDER — KETOROLAC TROMETHAMINE 30 MG/ML
30 INJECTION, SOLUTION INTRAMUSCULAR; INTRAVENOUS ONCE
Status: COMPLETED | OUTPATIENT
Start: 2023-02-24 | End: 2023-02-24

## 2023-02-24 RX ORDER — CYCLOBENZAPRINE HCL 10 MG
10 TABLET ORAL 3 TIMES DAILY PRN
Qty: 30 TABLET | Refills: 0 | Status: SHIPPED | OUTPATIENT
Start: 2023-02-24

## 2023-02-24 RX ORDER — METHYLPREDNISOLONE 4 MG/1
TABLET ORAL
Qty: 21 EACH | Refills: 0 | Status: SHIPPED | OUTPATIENT
Start: 2023-02-25

## 2023-02-24 RX ADMIN — TRIAMCINOLONE ACETONIDE 40 MG: 40 INJECTION, SUSPENSION INTRA-ARTICULAR; INTRAMUSCULAR at 09:22

## 2023-02-24 RX ADMIN — KETOROLAC TROMETHAMINE 30 MG: 30 INJECTION, SOLUTION INTRAMUSCULAR; INTRAVENOUS at 09:22

## 2023-03-02 DIAGNOSIS — M54.9 ACUTE RIGHT-SIDED BACK PAIN, UNSPECIFIED BACK LOCATION: Primary | ICD-10-CM

## 2023-03-02 RX ORDER — HYDROCODONE BITARTRATE AND ACETAMINOPHEN 7.5; 325 MG/1; MG/1
1 TABLET ORAL EVERY 6 HOURS PRN
Qty: 12 TABLET | Refills: 0 | Status: SHIPPED | OUTPATIENT
Start: 2023-03-02

## 2023-03-24 DIAGNOSIS — E66.01 OBESITY, MORBID, BMI 50 OR HIGHER: Primary | ICD-10-CM

## 2023-03-24 RX ORDER — SEMAGLUTIDE 0.5 MG/.5ML
0.5 INJECTION, SOLUTION SUBCUTANEOUS WEEKLY
Qty: 2 ML | Refills: 0 | Status: SHIPPED | OUTPATIENT
Start: 2023-03-24

## 2023-04-24 DIAGNOSIS — E66.01 OBESITY, MORBID, BMI 50 OR HIGHER: ICD-10-CM

## 2023-04-24 RX ORDER — SEMAGLUTIDE 0.5 MG/.5ML
0.5 INJECTION, SOLUTION SUBCUTANEOUS WEEKLY
Qty: 2 ML | Refills: 0 | Status: SHIPPED | OUTPATIENT
Start: 2023-04-24

## 2023-05-23 NOTE — PROGRESS NOTES
Answers for HPI/ROS submitted by the patient on 5/18/2023  Please describe your symptoms.: 3 month follow up for Wygosaravia.  Have you had these symptoms before?: No  How long have you been having these symptoms?: 1-4 days  Please list any medications you are currently taking for this condition.: This is not an appointment for symptoms.  Please describe any probable cause for these symptoms. : N/A  What is the primary reason for your visit?: Other    Patient ID: Melissa Hood is a 62 y.o. female     Patient Care Team:  Head, MEREDITH Soliman as PCP - General (Nurse Practitioner)    Subjective     Chief Complaint   Patient presents with   • Weight Check       History of Present Illness    Melissa Hood presents to [facility] today for follow-up on Wegovy.  Started 2/24/2023.  Doing well on Wegovy.  Has lost approximately 19 pounds.  Medication keeps her appetite under control.  Denies any adverse effects except for mild constipation initially however has resolved.  Denies any indigestion, heartburn, nausea, or vomiting, or any other concerns.    The following portions of the patient's history were reviewed and updated as appropriate: allergies, current medications, past family history, past medical history, past social history, past surgical history and problem list.       ROS    Vitals:    05/25/23 0803   BP: 118/80   Pulse: 88   Temp: 97 °F (36.1 °C)   SpO2: 97%       Documented weights    05/25/23 0803   Weight: 122 kg (270 lb)     Body mass index is 47.83 kg/m².    Results for orders placed or performed in visit on 02/24/23   POCT urinalysis dipstick, manual    Specimen: Urine   Result Value Ref Range    Color Yellow Yellow, Straw, Dark Yellow, Rebeca    Clarity, UA Clear Clear    Glucose, UA Negative Negative mg/dL    Bilirubin Negative Negative    Ketones, UA Negative Negative    Specific Gravity  1.005 1.005 - 1.030    Blood, UA Negative Negative    pH, Urine 5.0 5.0 - 8.0     Protein, POC Negative Negative mg/dL    Urobilinogen, UA Normal Normal, 0.2 E.U./dL    Leukocytes Negative Negative    Nitrite, UA Negative Negative           Objective     Physical Exam  Vitals reviewed.   Constitutional:       General: She is not in acute distress.  Cardiovascular:      Rate and Rhythm: Normal rate and regular rhythm.      Heart sounds: No murmur heard.  Pulmonary:      Effort: Pulmonary effort is normal.      Breath sounds: Normal breath sounds. No wheezing.   Abdominal:      Palpations: Abdomen is soft.   Neurological:      Mental Status: She is alert and oriented to person, place, and time.   Psychiatric:         Mood and Affect: Mood normal.            Assessment & Plan     Assessment/Plan     Diagnoses and all orders for this visit:    1. Obesity, Class III, BMI 40-49.9 (morbid obesity) (Primary)  -     Semaglutide-Weight Management (Wegovy) 1 MG/0.5ML solution auto-injector; Inject 0.5 mL under the skin into the appropriate area as directed 1 (One) Time Per Week.  Dispense: 2 mL; Refill: 0  -     CBC (No Diff); Future  -     Comprehensive Metabolic Panel; Future  -     Lipid Panel With / Chol / HDL Ratio; Future  -     TSH Rfx On Abnormal To Free T4; Future  -     Hemoglobin A1c; Future    2. Elevated LDL cholesterol level  -     Lipid Panel With / Chol / HDL Ratio; Future    3. Vitamin D deficiency  -     Vitamin D,25-Hydroxy; Future    4. Prediabetes  -     CBC (No Diff); Future  -     Comprehensive Metabolic Panel; Future  -     Lipid Panel With / Chol / HDL Ratio; Future  -     TSH Rfx On Abnormal To Free T4; Future  -     UA / M With / Rflx Culture([facility] ONLY) - Urine, Clean Catch; Future  -     Hemoglobin A1c; Future          Summary:  Melissa Hood is doing well on Wegovy injections.  She is finishing up 0.5 mg weekly.  She has had significant weight loss since starting medication.  Medication has helped curbing her appetite.  She has minimal adverse effect such as constipation.   Advised to make sure she drinks plenty of fluids and increase fiber in diet.  May also use MiraLAX daily as needed.  Will increase dose to 1 mg weekly.  She is to let us know if any problems.  Also advised to schedule for follow-up appointment in 3 months for annual physical with fasting labs a week before.    In the meantime, instructed to contact us sooner for any problems or concerns.    Follow Up:  Return in about 3 months (around 8/25/2023) for Annual with fasting labs the week before.  .    Patient was given instructions and counseling regarding condition or for health maintenance advice.  Please see specific information pulled into the AVS if appropriate.          Tonya Altman, APRN  Family Medicine  [facility] [place]  05/25/23  11:42 EDT

## 2023-05-25 ENCOUNTER — OFFICE VISIT (OUTPATIENT)
Dept: FAMILY MEDICINE CLINIC | Facility: CLINIC | Age: 63
End: 2023-05-25
Payer: COMMERCIAL

## 2023-05-25 VITALS
BODY MASS INDEX: 47.84 KG/M2 | OXYGEN SATURATION: 97 % | SYSTOLIC BLOOD PRESSURE: 118 MMHG | HEART RATE: 88 BPM | TEMPERATURE: 97 F | DIASTOLIC BLOOD PRESSURE: 80 MMHG | HEIGHT: 63 IN | WEIGHT: 270 LBS

## 2023-05-25 DIAGNOSIS — E55.9 VITAMIN D DEFICIENCY: ICD-10-CM

## 2023-05-25 DIAGNOSIS — E66.01 OBESITY, CLASS III, BMI 40-49.9 (MORBID OBESITY): Primary | ICD-10-CM

## 2023-05-25 DIAGNOSIS — E78.00 ELEVATED LDL CHOLESTEROL LEVEL: ICD-10-CM

## 2023-05-25 DIAGNOSIS — R73.03 PREDIABETES: ICD-10-CM

## 2023-05-25 RX ORDER — SEMAGLUTIDE 1 MG/.5ML
1 INJECTION, SOLUTION SUBCUTANEOUS WEEKLY
Qty: 2 ML | Refills: 0 | Status: SHIPPED | OUTPATIENT
Start: 2023-05-25

## 2023-07-03 ENCOUNTER — TELEPHONE (OUTPATIENT)
Dept: FAMILY MEDICINE CLINIC | Facility: CLINIC | Age: 63
End: 2023-07-03

## 2023-07-03 NOTE — TELEPHONE ENCOUNTER
Caller: Melissa Hood    Relationship: Self    Best call back number: [phone]    What is the best time to reach you: ANYTIME     Who are you requesting to speak with (clinical staff, provider,  specific staff member): CLINICAL STAFF     What was the call regarding: PATIENT IS REQUESTING A CALL BACK TO DISCUSS HER MEDICATIONS.     PLEASE ADVISE

## 2023-07-03 NOTE — TELEPHONE ENCOUNTER
Spoke with patients . He stated that they have found that the wegovy is in stock with [facility]. Patient was on a call with work so could not get clarification is she wanted to cancel the order for saxenda and wait for the wegovy or not. Waiting for pt to call back or send message through [employer].

## 2023-08-16 DIAGNOSIS — E66.01 OBESITY, CLASS III, BMI 40-49.9 (MORBID OBESITY): ICD-10-CM

## 2023-08-16 DIAGNOSIS — R73.03 PREDIABETES: ICD-10-CM

## 2023-08-16 DIAGNOSIS — E55.9 VITAMIN D DEFICIENCY: ICD-10-CM

## 2023-08-16 DIAGNOSIS — E78.00 ELEVATED LDL CHOLESTEROL LEVEL: ICD-10-CM

## 2023-08-18 RX ORDER — FLURBIPROFEN SODIUM 0.3 MG/ML
SOLUTION/ DROPS OPHTHALMIC
COMMUNITY
Start: 2023-07-17 | End: 2023-08-21

## 2023-08-19 LAB
25(OH)D3+25(OH)D2 SERPL-MCNC: 31.1 NG/ML (ref 30–100)
ALBUMIN SERPL-MCNC: 4.1 G/DL (ref 3.5–5.2)
ALBUMIN/GLOB SERPL: 2 G/DL
ALP SERPL-CCNC: 108 U/L (ref 39–117)
ALT SERPL-CCNC: 15 U/L (ref 1–33)
APPEARANCE UR: CLEAR
AST SERPL-CCNC: 19 U/L (ref 1–32)
BACTERIA #/AREA URNS HPF: NORMAL /HPF
BACTERIA UR CULT: NORMAL
BACTERIA UR CULT: NORMAL
BILIRUB SERPL-MCNC: 0.3 MG/DL (ref 0–1.2)
BILIRUB UR QL STRIP: NEGATIVE
BUN SERPL-MCNC: 10 MG/DL (ref 8–23)
BUN/CREAT SERPL: 15.4 (ref 7–25)
CALCIUM SERPL-MCNC: 9.4 MG/DL (ref 8.6–10.5)
CASTS URNS QL MICRO: NORMAL /LPF
CHLORIDE SERPL-SCNC: 103 MMOL/L (ref 98–107)
CHOLEST SERPL-MCNC: 169 MG/DL (ref 0–200)
CHOLEST/HDLC SERPL: 3.19 {RATIO}
CO2 SERPL-SCNC: 26.1 MMOL/L (ref 22–29)
COLOR UR: YELLOW
CREAT SERPL-MCNC: 0.65 MG/DL (ref 0.57–1)
EGFRCR SERPLBLD CKD-EPI 2021: 99.1 ML/MIN/1.73
EPI CELLS #/AREA URNS HPF: NORMAL /HPF (ref 0–10)
ERYTHROCYTE [DISTWIDTH] IN BLOOD BY AUTOMATED COUNT: 13.7 % (ref 12.3–15.4)
GLOBULIN SER CALC-MCNC: 2.1 GM/DL
GLUCOSE SERPL-MCNC: 88 MG/DL (ref 65–99)
GLUCOSE UR QL STRIP: NEGATIVE
HBA1C MFR BLD: 5.5 % (ref 4.8–5.6)
HCT VFR BLD AUTO: 41.4 % (ref 34–46.6)
HDLC SERPL-MCNC: 53 MG/DL (ref 40–60)
HGB BLD-MCNC: 13.4 G/DL (ref 12–15.9)
HGB UR QL STRIP: NEGATIVE
KETONES UR QL STRIP: NEGATIVE
LDLC SERPL CALC-MCNC: 100 MG/DL (ref 0–100)
LEUKOCYTE ESTERASE UR QL STRIP: ABNORMAL
MCH RBC QN AUTO: 27.1 PG (ref 26.6–33)
MCHC RBC AUTO-ENTMCNC: 32.4 G/DL (ref 31.5–35.7)
MCV RBC AUTO: 83.8 FL (ref 79–97)
MICRO URNS: ABNORMAL
NITRITE UR QL STRIP: NEGATIVE
PH UR STRIP: 7 [PH] (ref 5–7.5)
PLATELET # BLD AUTO: 302 10*3/MM3 (ref 140–450)
POTASSIUM SERPL-SCNC: 4.5 MMOL/L (ref 3.5–5.2)
PROT SERPL-MCNC: 6.2 G/DL (ref 6–8.5)
PROT UR QL STRIP: NEGATIVE
RBC # BLD AUTO: 4.94 10*6/MM3 (ref 3.77–5.28)
RBC #/AREA URNS HPF: NORMAL /HPF (ref 0–2)
SODIUM SERPL-SCNC: 139 MMOL/L (ref 136–145)
SP GR UR STRIP: 1 (ref 1–1.03)
TRIGL SERPL-MCNC: 87 MG/DL (ref 0–150)
TSH SERPL DL<=0.005 MIU/L-ACNC: 2.89 UIU/ML (ref 0.27–4.2)
URINALYSIS REFLEX: ABNORMAL
UROBILINOGEN UR STRIP-MCNC: 0.2 MG/DL (ref 0.2–1)
VLDLC SERPL CALC-MCNC: 16 MG/DL (ref 5–40)
WBC # BLD AUTO: 5.27 10*3/MM3 (ref 3.4–10.8)
WBC #/AREA URNS HPF: NORMAL /HPF (ref 0–5)

## 2023-08-21 ENCOUNTER — OFFICE VISIT (OUTPATIENT)
Dept: FAMILY MEDICINE CLINIC | Facility: CLINIC | Age: 63
End: 2023-08-21
Payer: COMMERCIAL

## 2023-08-21 VITALS
TEMPERATURE: 98 F | HEIGHT: 63 IN | DIASTOLIC BLOOD PRESSURE: 82 MMHG | BODY MASS INDEX: 46.78 KG/M2 | WEIGHT: 264 LBS | HEART RATE: 69 BPM | SYSTOLIC BLOOD PRESSURE: 120 MMHG | OXYGEN SATURATION: 98 %

## 2023-08-21 DIAGNOSIS — Z00.00 ANNUAL PHYSICAL EXAM: Primary | ICD-10-CM

## 2023-08-21 DIAGNOSIS — Z12.11 COLON CANCER SCREENING: ICD-10-CM

## 2023-08-21 DIAGNOSIS — E78.00 ELEVATED LDL CHOLESTEROL LEVEL: ICD-10-CM

## 2023-08-21 DIAGNOSIS — M85.80 OSTEOPENIA, UNSPECIFIED LOCATION: ICD-10-CM

## 2023-08-21 DIAGNOSIS — Z86.010 HISTORY OF COLON POLYPS: ICD-10-CM

## 2023-08-21 DIAGNOSIS — E55.9 VITAMIN D DEFICIENCY: ICD-10-CM

## 2023-08-21 DIAGNOSIS — Z12.39 ENCOUNTER FOR SCREENING FOR MALIGNANT NEOPLASM OF BREAST, UNSPECIFIED SCREENING MODALITY: ICD-10-CM

## 2023-08-21 DIAGNOSIS — R73.03 PREDIABETES: ICD-10-CM

## 2023-08-21 DIAGNOSIS — E66.01 CLASS 3 SEVERE OBESITY DUE TO EXCESS CALORIES WITHOUT SERIOUS COMORBIDITY WITH BODY MASS INDEX (BMI) OF 45.0 TO 49.9 IN ADULT: ICD-10-CM

## 2023-08-21 DIAGNOSIS — Z12.11 ENCOUNTER FOR HEMOCCULT SCREENING: ICD-10-CM

## 2023-08-21 DIAGNOSIS — Z01.419 PAP SMEAR, LOW-RISK: ICD-10-CM

## 2023-08-21 PROCEDURE — 82270 OCCULT BLOOD FECES: CPT | Performed by: NURSE PRACTITIONER

## 2023-08-21 PROCEDURE — 99396 PREV VISIT EST AGE 40-64: CPT | Performed by: NURSE PRACTITIONER

## 2023-08-21 NOTE — PROGRESS NOTES
Patient ID: Melissa Hood is a 62 y.o. female     Patient Care Team:  Head, MEREDITH Soliman as PCP - General (Nurse Practitioner)    Subjective     Chief Complaint   Patient presents with    follow up labs     Obesity    Annual Exam       History of Present Illness    Melissa Hood presents to [facility] today for annual physical exam.  Denies any problems or concerns.  She is currently tolerating Saxenda daily injections of 2.4 mg daily.  Minimal adverse effects.  She is initially started on Wegovy in February.  Weight at that time was 290 pounds.  Down approximately 26 pounds.  Medication controls her appetite and able to limit portions.  She has noticed her clothes fitting better.      Health Habits:  Dental Exam: Up to date  Eye Exam: Up to date  Diet: Weight Watchers and Saxenda.    Exercise: 3 times/week.  Current exercise activities include: walking  Pap:  Pap IG, Rfx HPV All Pth (11/14/2019 11:43)  Mammogram: Mammo Screening Digital Tomosynthesis Bilateral With CAD (02/24/2023 15:00)  Dexa: 4 years at age 59 - osteopenia.    Colonoscopy: Completed in [place].  5 year follow-up.        She denies any complaints of fever, chills, cough, chest pain, shortness of air, abdominal pain, nausea, or any other concerns.     The following portions of the patient's history were reviewed and updated as appropriate: allergies, current medications, past family history, past medical history, past social history, past surgical history and problem list.       ROS    Vitals:    08/21/23 0841   BP: 120/82   Pulse: 69   Temp: 98 øF (36.7 øC)   SpO2: 98%       Documented weights    08/21/23 0841   Weight: 120 kg (264 lb)     Body mass index is 46.77 kg/mý.    Results for orders placed or performed in visit on 08/21/23   POCT occult blood x 1 stool    Specimen: Stool   Result Value Ref Range    Fecal Occult Blood Negative Negative    Lot Number [id number]     Expiration Date 3-31-25     DEVELOPER  LOT NUMBER [id number]     DEVELOPER EXPIRATION DATE 3-31-25     Positive Control Positive Positive    Negative Control Negative Negative           Objective     Physical Exam  Exam conducted with a chaperone present (Ximena Ernst MA).   Constitutional:       Appearance: She is well-developed.   HENT:      Head: Normocephalic and atraumatic.      Right Ear: Tympanic membrane normal.      Left Ear: Tympanic membrane normal.   Eyes:      Pupils: Pupils are equal, round, and reactive to light.   Cardiovascular:      Rate and Rhythm: Normal rate and regular rhythm.      Heart sounds: Normal heart sounds. No murmur heard.  Pulmonary:      Effort: Pulmonary effort is normal.      Breath sounds: Normal breath sounds. No wheezing, rhonchi or rales.   Abdominal:      General: Bowel sounds are normal.      Palpations: Abdomen is soft.      Tenderness: There is no abdominal tenderness.   Genitourinary:     Vagina: Normal.      Cervix: Normal.      Uterus: Normal.       Adnexa: Right adnexa normal and left adnexa normal.      Rectum: Normal. Guaiac result negative.   Musculoskeletal:         General: No tenderness. Normal range of motion.      Cervical back: Normal range of motion and neck supple.   Skin:     General: Skin is warm and dry.      Findings: No erythema or rash.   Neurological:      Mental Status: She is alert and oriented to person, place, and time.   Psychiatric:         Behavior: Behavior normal.     Patient's (Body mass index is 46.77 kg/mý.) indicates that they are morbidly obese (BMI > 40 or > 35 with obesity - related health condition) with health related conditions that include none . Weight is improving with treatment. BMI is is above average; BMI management plan is completed. We discussed portion control, increasing exercise, Weight Watchers or other Commercial based weight reduction program, and Continue Saxenda .        Assessment & Plan     Assessment/Plan     Diagnoses and all orders for this  visit:    1. Annual physical exam (Primary)  -     CBC (No Diff); Future  -     Comprehensive Metabolic Panel; Future  -     Lipid Panel With / Chol / HDL Ratio; Future  -     TSH Rfx On Abnormal To Free T4; Future  -     UA / M With / Rflx Culture([facility] ONLY) - Urine, Clean Catch; Future    2. Pap smear, low-risk  -     IGP,rfx Aptima HPV All Pth    3. Encounter for Hemoccult screening  -     POCT occult blood x 1 stool    4. Class 3 severe obesity due to excess calories without serious comorbidity with body mass index (BMI) of 45.0 to 49.9 in adult  -     Liraglutide (SAXENDA) 18 MG/3ML injection pen; Inject 3 mg under the skin into the appropriate area as directed Daily.  Dispense: 15 mL; Refill: 5  -     Insulin Pen Needle 32G X 6 MM misc; 1 dose Daily.  Dispense: 90 each; Refill: 1    5. Colon cancer screening  -     Ambulatory Referral For Screening Colonoscopy    6. History of colon polyps  -     Ambulatory Referral For Screening Colonoscopy    7. Vitamin D deficiency  -     Vitamin D,25-Hydroxy; Future    8. Prediabetes  -     Comprehensive Metabolic Panel; Future  -     Hemoglobin A1c; Future    9. Elevated LDL cholesterol level  -     Lipid Panel With / Chol / HDL Ratio; Future    10. Osteopenia, unspecified location  -     DEXA Bone Density Axial; Future    11. Encounter for screening for malignant neoplasm of breast, unspecified screening modality  -     Mammo screening digital tomosynthesis bilateral w CAD; Future          Summary:  Melissa Hood has been doing well since she was last seen.  She is especially doing well on Saxenda daily injections for weight loss.  Sent In new prescription for the maintenance dose of 3 mg daily.  Continue to watch diet and incorporate exercise into daily routine.  Reviewed recent labs along with patient which all are stable.  Her hemoglobin A1c is improved and no longer in prediabetic range due to her weight loss.  Blood pressure stable at 120/82.  We will have her  return in 6 months for next recheck on her weight loss.  In 1 year for next annual physical with fasting labs a week before.    In the meantime, instructed to contact us sooner for any problems or concerns.    Follow Up:  Return in about 6 months (around 2/21/2024) for Recheck on weight loss.  No labs.  .    Patient was given instructions and counseling regarding condition or for health maintenance advice.  Please see specific information pulled into the AVS if appropriate.          Tonya Altman, APRN  Family Medicine  [facility] [place]  08/21/23  09:33 EDT

## 2023-09-21 ENCOUNTER — TELEPHONE (OUTPATIENT)
Dept: GASTROENTEROLOGY | Facility: CLINIC | Age: 63
End: 2023-09-21
Payer: COMMERCIAL

## 2023-09-21 NOTE — TELEPHONE ENCOUNTER
Caller: Hood Melissa    Relationship: Self    Best call back number: [phone]     What form or medical record are you requesting: COLONOSCOPY PAPERWORK     Who is requesting this form or medical record from you: PATIENT     How would you like to receive the form or medical records (pick-up, mail, fax): IF THERE IS ANY WAY PAPERWORK CAN BE UPLOADED TO [employer] SHE WOULD PREFER THAT AND IF NOT PLEASE RE-MAIL TO ADDRESS ON FILE.     Timeframe paperwork needed: ASAP     Additional notes:  PATIENT HAS MISPLACED PAPERWORK.

## 2023-10-16 ENCOUNTER — TELEPHONE (OUTPATIENT)
Dept: GASTROENTEROLOGY | Facility: CLINIC | Age: 63
End: 2023-10-16
Payer: COMMERCIAL

## 2023-10-16 DIAGNOSIS — Z86.010 PERSONAL HISTORY OF COLONIC POLYPS: Primary | ICD-10-CM

## 2023-10-16 NOTE — TELEPHONE ENCOUNTER
FAST TRACK - REFERRAL  LAST COLONOSCOPY 2018 AT [facility]  PERSONAL HISTORY POLYPS  FAMILY HISTORY POLYS, BROTHER AGE 65  [facility]

## 2023-11-03 DIAGNOSIS — E66.01 CLASS 3 SEVERE OBESITY DUE TO EXCESS CALORIES WITHOUT SERIOUS COMORBIDITY WITH BODY MASS INDEX (BMI) OF 45.0 TO 49.9 IN ADULT: ICD-10-CM

## 2023-12-06 DIAGNOSIS — E66.01 CLASS 3 SEVERE OBESITY DUE TO EXCESS CALORIES WITHOUT SERIOUS COMORBIDITY WITH BODY MASS INDEX (BMI) OF 45.0 TO 49.9 IN ADULT: ICD-10-CM

## 2023-12-19 ENCOUNTER — OFFICE VISIT (OUTPATIENT)
Dept: FAMILY MEDICINE CLINIC | Facility: CLINIC | Age: 63
End: 2023-12-19
Payer: COMMERCIAL

## 2023-12-19 VITALS
WEIGHT: 261 LBS | HEART RATE: 96 BPM | TEMPERATURE: 98.6 F | SYSTOLIC BLOOD PRESSURE: 132 MMHG | BODY MASS INDEX: 46.25 KG/M2 | OXYGEN SATURATION: 97 % | HEIGHT: 63 IN | DIASTOLIC BLOOD PRESSURE: 80 MMHG

## 2023-12-19 DIAGNOSIS — U07.1 COVID-19: Primary | ICD-10-CM

## 2023-12-19 DIAGNOSIS — R05.1 ACUTE COUGH: ICD-10-CM

## 2023-12-19 LAB
EXPIRATION DATE: ABNORMAL
FLUAV AG UPPER RESP QL IA.RAPID: NOT DETECTED
FLUBV AG UPPER RESP QL IA.RAPID: NOT DETECTED
INTERNAL CONTROL: ABNORMAL
Lab: ABNORMAL
SARS-COV-2 AG UPPER RESP QL IA.RAPID: DETECTED

## 2023-12-19 PROCEDURE — 99213 OFFICE O/P EST LOW 20 MIN: CPT | Performed by: NURSE PRACTITIONER

## 2023-12-19 PROCEDURE — 87428 SARSCOV & INF VIR A&B AG IA: CPT | Performed by: NURSE PRACTITIONER

## 2023-12-19 NOTE — PROGRESS NOTES
"Chief Complaint   Patient presents with    Cough    Headache    Wheezing       Upper Respiratory Infection: Patient complains of symptoms of a URI. Symptoms include congestion, cough, and sore throat. Onset of symptoms was 2 days ago, gradually worsening since that time. She also c/o achiness, congestion, no  fever, and wheezing for the past 2 days .  She is drinking moderate amounts of fluids. Evaluation to date: none. Treatment to date: cough suppressants.  Ill contacts at home or school or work discussed.  None.      The following portions of the patient's history were reviewed and updated as appropriate: allergies, current medications, past family history, past medical history, past social history, past surgical history and problem list.        Vitals:    12/19/23 0958   BP: 132/80   BP Location: Left arm   Patient Position: Sitting   Cuff Size: Adult   Pulse: 96   Temp: 98.6 °F (37 °C)   SpO2: 97%   Weight: 118 kg (261 lb)   Height: 160 cm (63\")     Gen: Ill appearing, alert, masked  Ears: TM's normal without redness  Nose:  Not examined due to Covid +  Throat:  Not examined due to Covid +  Neck: No LAD  Lung: Good air movement, regular RR  Heart: RR without murmur  Skin: No rash    POCT SARS-CoV-2 + Flu Antigen DAKSHA (12/19/2023 10:09)    Assessment & Plan   Diagnoses and all orders for this visit:    1. COVID-19 (Primary)  -     Nirmatrelvir & Ritonavir, 300mg/100mg, (PAXLOVID) 20 x 150 MG & 10 x 100MG tablet therapy pack tablet; Take 3 tablets by mouth 2 (Two) Times a Day for 5 days.  Dispense: 30 tablet; Refill: 0    2. Acute cough  -     POCT SARS-CoV-2 + Flu Antigen DAKSHA         Tylenol or Advil as needed for pain, fever, muscle aches  Plenty of fluids  Hand washing discussed  Off work or school note given if needed.  Warm tea for throat.  Pros and cons of antibiotic use discussed.  Instructed to notify us if symptoms worsen or do not improve.      Treatment for Covid is rest, drink plenty of fluids, " Tylenol/ibuprofen as needed for body aches or fever, vitamin C, and zinc.  Mucinex as needed if any chest congestion.  Quarantine for at least 5 days from onset of symptoms.  On day 6, if symptoms have improved and no fever for 24 hours, may come out of quarantine however need to continue to wear mask for additional 5 days.        Patient was wearing face mask when I entered the room and throughout our encounter. Protective equipment was worn throughout this patient encounter including a face mask.  Hand hygiene was performed before donning protective equipment and after removal when leaving the room.     Tonya Altman, APRN  Family Practice  [facility] [place]

## 2023-12-26 DIAGNOSIS — E66.01 CLASS 3 SEVERE OBESITY DUE TO EXCESS CALORIES WITHOUT SERIOUS COMORBIDITY WITH BODY MASS INDEX (BMI) OF 45.0 TO 49.9 IN ADULT: ICD-10-CM

## 2023-12-26 RX ORDER — LIRAGLUTIDE 6 MG/ML
INJECTION, SOLUTION SUBCUTANEOUS
Qty: 15 ML | Refills: 1 | Status: SHIPPED | OUTPATIENT
Start: 2023-12-26

## 2024-01-21 DIAGNOSIS — E66.01 CLASS 3 SEVERE OBESITY DUE TO EXCESS CALORIES WITHOUT SERIOUS COMORBIDITY WITH BODY MASS INDEX (BMI) OF 45.0 TO 49.9 IN ADULT: ICD-10-CM

## 2024-01-22 RX ORDER — LIRAGLUTIDE 6 MG/ML
INJECTION, SOLUTION SUBCUTANEOUS
Qty: 15 ML | Refills: 1 | OUTPATIENT
Start: 2024-01-22

## 2024-02-07 DIAGNOSIS — E66.01 CLASS 3 SEVERE OBESITY DUE TO EXCESS CALORIES WITHOUT SERIOUS COMORBIDITY WITH BODY MASS INDEX (BMI) OF 45.0 TO 49.9 IN ADULT: ICD-10-CM

## 2024-02-07 RX ORDER — LIRAGLUTIDE 6 MG/ML
INJECTION, SOLUTION SUBCUTANEOUS
Qty: 3 ML | OUTPATIENT
Start: 2024-02-07

## 2024-02-07 RX ORDER — LIRAGLUTIDE 6 MG/ML
3 INJECTION, SOLUTION SUBCUTANEOUS DAILY
Qty: 15 ML | Refills: 1 | Status: SHIPPED | OUTPATIENT
Start: 2024-02-07 | End: 2024-02-07 | Stop reason: SDUPTHER

## 2024-02-09 RX ORDER — LIRAGLUTIDE 6 MG/ML
3 INJECTION, SOLUTION SUBCUTANEOUS DAILY
Qty: 15 ML | Refills: 1 | Status: SHIPPED | OUTPATIENT
Start: 2024-02-09

## 2024-02-09 NOTE — TELEPHONE ENCOUNTER
Not available at [facility] where medication was originally sent pt requested to be sent to [facility]

## 2024-02-12 ENCOUNTER — TELEPHONE (OUTPATIENT)
Dept: FAMILY MEDICINE CLINIC | Facility: CLINIC | Age: 64
End: 2024-02-12
Payer: COMMERCIAL

## 2024-02-14 ENCOUNTER — TELEPHONE (OUTPATIENT)
Dept: GASTROENTEROLOGY | Facility: CLINIC | Age: 64
End: 2024-02-14
Payer: COMMERCIAL

## 2024-02-15 PROBLEM — Z86.010 PERSONAL HISTORY OF COLONIC POLYPS: Status: ACTIVE | Noted: 2023-10-16

## 2024-02-15 PROBLEM — Z86.0100 PERSONAL HISTORY OF COLONIC POLYPS: Status: ACTIVE | Noted: 2023-10-16

## 2024-03-01 ENCOUNTER — HOSPITAL ENCOUNTER (OUTPATIENT)
Dept: BONE DENSITY | Facility: HOSPITAL | Age: 64
Discharge: HOME | End: 2024-03-01
Payer: COMMERCIAL

## 2024-03-01 ENCOUNTER — HOSPITAL ENCOUNTER (OUTPATIENT)
Dept: MAMMOGRAPHY | Facility: HOSPITAL | Age: 64
Discharge: HOME | End: 2024-03-01
Payer: COMMERCIAL

## 2024-03-01 DIAGNOSIS — Z12.39 ENCOUNTER FOR SCREENING FOR MALIGNANT NEOPLASM OF BREAST, UNSPECIFIED SCREENING MODALITY: ICD-10-CM

## 2024-03-01 DIAGNOSIS — M85.80 OSTEOPENIA, UNSPECIFIED LOCATION: ICD-10-CM

## 2024-03-01 PROCEDURE — 77067 SCR MAMMO BI INCL CAD: CPT

## 2024-03-01 PROCEDURE — 77080 DXA BONE DENSITY AXIAL: CPT

## 2024-03-01 PROCEDURE — 77063 BREAST TOMOSYNTHESIS BI: CPT

## 2024-04-10 DIAGNOSIS — E66.01 CLASS 3 SEVERE OBESITY DUE TO EXCESS CALORIES WITHOUT SERIOUS COMORBIDITY WITH BODY MASS INDEX (BMI) OF 45.0 TO 49.9 IN ADULT: ICD-10-CM

## 2024-04-11 RX ORDER — LIRAGLUTIDE 6 MG/ML
3 INJECTION, SOLUTION SUBCUTANEOUS DAILY
Qty: 15 ML | Refills: 0 | Status: SHIPPED | OUTPATIENT
Start: 2024-04-11

## 2024-04-16 ENCOUNTER — ANESTHESIA EVENT (OUTPATIENT)
Dept: PERIOP | Facility: HOSPITAL | Age: 64
End: 2024-04-16
Payer: COMMERCIAL

## 2024-04-17 ENCOUNTER — HOSPITAL ENCOUNTER (OUTPATIENT)
Facility: HOSPITAL | Age: 64
Setting detail: HOSPITAL OUTPATIENT SURGERY
Discharge: HOME | End: 2024-04-17
Attending: INTERNAL MEDICINE | Admitting: INTERNAL MEDICINE
Payer: COMMERCIAL

## 2024-04-17 ENCOUNTER — ANESTHESIA (OUTPATIENT)
Dept: PERIOP | Facility: HOSPITAL | Age: 64
End: 2024-04-17
Payer: COMMERCIAL

## 2024-04-17 VITALS
HEART RATE: 66 BPM | RESPIRATION RATE: 16 BRPM | OXYGEN SATURATION: 98 % | DIASTOLIC BLOOD PRESSURE: 72 MMHG | TEMPERATURE: 97.7 F | WEIGHT: 249.4 LBS | SYSTOLIC BLOOD PRESSURE: 115 MMHG | BODY MASS INDEX: 44.18 KG/M2

## 2024-04-17 DIAGNOSIS — Z86.010 PERSONAL HISTORY OF COLONIC POLYPS: ICD-10-CM

## 2024-04-17 PROCEDURE — 88305 TISSUE EXAM BY PATHOLOGIST: CPT | Performed by: INTERNAL MEDICINE

## 2024-04-17 PROCEDURE — 25010000002 PROPOFOL 200 MG/20ML EMULSION: Performed by: NURSE ANESTHETIST, CERTIFIED REGISTERED

## 2024-04-17 PROCEDURE — 45380 COLONOSCOPY AND BIOPSY: CPT | Performed by: INTERNAL MEDICINE

## 2024-04-17 PROCEDURE — 25810000003 LACTATED RINGERS PER 1000 ML: Performed by: NURSE ANESTHETIST, CERTIFIED REGISTERED

## 2024-04-17 RX ORDER — SODIUM CHLORIDE, SODIUM LACTATE, POTASSIUM CHLORIDE, CALCIUM CHLORIDE 600; 310; 30; 20 MG/100ML; MG/100ML; MG/100ML; MG/100ML
9 INJECTION, SOLUTION INTRAVENOUS CONTINUOUS PRN
Status: DISCONTINUED | OUTPATIENT
Start: 2024-04-17 | End: 2024-04-17 | Stop reason: HOSPADM

## 2024-04-17 RX ORDER — SODIUM CHLORIDE, SODIUM LACTATE, POTASSIUM CHLORIDE, CALCIUM CHLORIDE 600; 310; 30; 20 MG/100ML; MG/100ML; MG/100ML; MG/100ML
100 INJECTION, SOLUTION INTRAVENOUS ONCE
Status: DISCONTINUED | OUTPATIENT
Start: 2024-04-17 | End: 2024-04-17 | Stop reason: HOSPADM

## 2024-04-17 RX ORDER — SODIUM CHLORIDE 9 MG/ML
40 INJECTION, SOLUTION INTRAVENOUS AS NEEDED
Status: DISCONTINUED | OUTPATIENT
Start: 2024-04-17 | End: 2024-04-17 | Stop reason: HOSPADM

## 2024-04-17 RX ORDER — ONDANSETRON 2 MG/ML
4 INJECTION INTRAMUSCULAR; INTRAVENOUS ONCE AS NEEDED
Status: DISCONTINUED | OUTPATIENT
Start: 2024-04-17 | End: 2024-04-17 | Stop reason: HOSPADM

## 2024-04-17 RX ORDER — SODIUM CHLORIDE 0.9 % (FLUSH) 0.9 %
10 SYRINGE (ML) INJECTION AS NEEDED
Status: DISCONTINUED | OUTPATIENT
Start: 2024-04-17 | End: 2024-04-17 | Stop reason: HOSPADM

## 2024-04-17 RX ORDER — PROPOFOL 10 MG/ML
INJECTION, EMULSION INTRAVENOUS AS NEEDED
Status: DISCONTINUED | OUTPATIENT
Start: 2024-04-17 | End: 2024-04-17 | Stop reason: SURG

## 2024-04-17 RX ORDER — LIDOCAINE HYDROCHLORIDE 20 MG/ML
INJECTION, SOLUTION INFILTRATION; PERINEURAL AS NEEDED
Status: DISCONTINUED | OUTPATIENT
Start: 2024-04-17 | End: 2024-04-17 | Stop reason: SURG

## 2024-04-17 RX ORDER — SODIUM CHLORIDE 0.9 % (FLUSH) 0.9 %
10 SYRINGE (ML) INJECTION EVERY 12 HOURS SCHEDULED
Status: DISCONTINUED | OUTPATIENT
Start: 2024-04-17 | End: 2024-04-17 | Stop reason: HOSPADM

## 2024-04-17 RX ADMIN — PROPOFOL 30 MG: 10 INJECTION, EMULSION INTRAVENOUS at 14:40

## 2024-04-17 RX ADMIN — PROPOFOL 30 MG: 10 INJECTION, EMULSION INTRAVENOUS at 14:31

## 2024-04-17 RX ADMIN — PROPOFOL 100 MG: 10 INJECTION, EMULSION INTRAVENOUS at 14:26

## 2024-04-17 RX ADMIN — PROPOFOL 30 MG: 10 INJECTION, EMULSION INTRAVENOUS at 14:37

## 2024-04-17 RX ADMIN — PROPOFOL 30 MG: 10 INJECTION, EMULSION INTRAVENOUS at 14:28

## 2024-04-17 RX ADMIN — SODIUM CHLORIDE, POTASSIUM CHLORIDE, SODIUM LACTATE AND CALCIUM CHLORIDE 9 ML/HR: 600; 310; 30; 20 INJECTION, SOLUTION INTRAVENOUS at 13:09

## 2024-04-17 RX ADMIN — LIDOCAINE HYDROCHLORIDE 100 MG: 20 INJECTION, SOLUTION INFILTRATION; PERINEURAL at 14:19

## 2024-04-17 RX ADMIN — PROPOFOL 30 MG: 10 INJECTION, EMULSION INTRAVENOUS at 14:34

## 2024-04-17 NOTE — H&P
Patient Care Team:  Head, MEREDITH Soliman as PCP - General (Nurse Practitioner)    CHIEF COMPLAINT: Personal hx colon polyps    HISTORY OF PRESENT ILLNESS:  Last exam was 2018    Past Medical History:   Diagnosis Date    Allergic 1985    Seasonal    Arthritis 2010    Knees [place]    Asthma 1980    Occasional only    Cataract 2018    Both eyes removed    Iron deficiency anemia     Left thyroid nodule     Obesity 1959    All my life    Osteopenia     Vitamin D deficiency      Past Surgical History:   Procedure Laterality Date    BARIATRIC SURGERY  2014    CATARACT EXTRACTION, BILATERAL  2017    GASTRIC BYPASS  2011    JOINT REPLACEMENT  2017    REPLACEMENT TOTAL KNEE Left 2016    TONSILLECTOMY       Family History   Problem Relation Age of Onset    Hypertension Mother     Stroke Mother     Diabetes Mother     Breast cancer Mother     Arthritis Mother     Cancer Mother     Cancer Father     Breast cancer Brother     Alcohol abuse Brother     Asthma Brother     Alcohol abuse Maternal Uncle     Cancer Brother      Social History     Tobacco Use    Smoking status: Never    Smokeless tobacco: Never   Vaping Use    Vaping status: Never Used   Substance Use Topics    Alcohol use: No    Drug use: No     Medications Prior to Admission   Medication Sig Dispense Refill Last Dose    Multiple Vitamins-Minerals (MULTI COMPLETE PO) Take  by mouth.   4/16/2024    Insulin Pen Needle 32G X 6 MM misc Use 1 dose Daily. 90 each 1     Saxenda 18 MG/3ML injection pen inject 3mg under the skin into the appropriate area daily as directed 15 mL 0 4/9/2024     Allergies:  Patient has no known allergies.    REVIEW OF SYSTEMS:  Please see the above history of present illness for pertinent positives and negatives.  The remainder of the patient's systems have been reviewed and are negative.     Vital Signs  Temp:  [98 °F (36.7 °C)] 98 °F (36.7 °C)  BP: (141)/(110) 141/110    Flowsheet Rows      Flowsheet Row First Filed Value   Admission Height --    Admission Weight 113 kg (249 lb 6.4 oz) Documented at 04/17/2024 1254             Physical Exam:  Physical Exam   Constitutional: Patient appears well-developed and well-nourished and in no acute distress   HEENT:   Head: Normocephalic and atraumatic.   Eyes:  Pupils are equal, round, and reactive to light. EOM are intact. Sclerae are anicteric and non-injected.  Mouth and Throat: Patient has moist mucous membranes. Oropharynx is clear of any erythema or exudate.     Neck: Neck supple. No JVD present. No thyromegaly present. No lymphadenopathy present.  Cardiovascular: Regular rate, regular rhythm, S1 normal and S2 normal.  Exam reveals no gallop and no friction rub.  No murmur heard.  Pulmonary/Chest: Lungs are clear to auscultation bilaterally. No respiratory distress. No wheezes. No rhonchi. No rales.   Abdominal: Soft. Bowel sounds are normal. No distension and no mass. There is no hepatosplenomegaly. There is no tenderness.   Musculoskeletal: Normal Muscle tone  Extremities: No edema. Pulses are palpable in all 4 extremities.  Neurological: Patient is alert and oriented to person, place, and time. Cranial nerves II-XII are grossly intact with no focal deficits.  Skin: Skin is warm. No rash noted. Nails show no clubbing.  No cyanosis or erythema.    Debilities/Disabilities Identified: None  Emotional Behavior: Appropriate     Results Review:   I reviewed the patient's new clinical results.    Lab Results (most recent)       None            Imaging Results (Most Recent)       None          reviewed    ECG/EMG Results (most recent)       None          reviewed    Assessment & Plan   Personal hx colon polyps/  colonoscopy      I discussed the patient's findings and my recommendations with patient.     Conrad Garza MD  04/17/24  13:07 EDT    Time: 10 min prior to procedure.

## 2024-04-17 NOTE — ANESTHESIA POSTPROCEDURE EVALUATION
Patient: Melissa Hood    Procedure Summary       Date: 04/17/24 Room / Location: [facility] ENDOSCOPY 1 /  [facility]    Anesthesia Start: 1413 Anesthesia Stop: 1451    Procedure: COLONOSCOPY WITH POLYPECTOMY Diagnosis:       Personal history of colonic polyps      Diverticulosis      Colon polyp      (Personal history of colonic polyps [Z86.010])    Surgeons: Conrad Garza MD Provider: Sammy Martínez CRNA    Anesthesia Type: MAC ASA Status: 2            Anesthesia Type: MAC    Vitals  Vitals Value Taken Time   /90 04/17/24 1510   Temp 97.7 °F (36.5 °C) 04/17/24 1451   Pulse 64 04/17/24 1515   Resp 16 04/17/24 1510   SpO2 99 % 04/17/24 1515   Vitals shown include unfiled device data.        Post Anesthesia Care and Evaluation    Patient location during evaluation: bedside  Patient participation: complete - patient participated  Level of consciousness: awake and alert  Pain score: 0  Pain management: adequate    Airway patency: patent  Anesthetic complications: No anesthetic complications  PONV Status: none  Cardiovascular status: acceptable  Respiratory status: acceptable  Hydration status: acceptable

## 2024-04-17 NOTE — BRIEF OP NOTE
COLONOSCOPY WITH POLYPECTOMY  Progress Note    Melissa Hood  4/17/2024    Pre-op Diagnosis:   Personal history of colonic polyps [Z86.010]       Post-Op Diagnosis Codes:     * Personal history of colonic polyps [Z86.010]     * Diverticulosis [K57.90]     * Colon polyp [K63.5]    Procedure/CPT® Codes:        Procedure(s):  COLONOSCOPY WITH POLYPECTOMY              Surgeon(s):  Conrad Garza MD    Anesthesia: Monitored Anesthesia Care    Staff:   Circulator: Rupert Flowers RN; Tootie Ramsey RN  Scrub Person: Chavo Wolfe; Brady Davey         Estimated Blood Loss: none    Urine Voided: * No values recorded between 4/17/2024  2:12 PM and 4/17/2024  2:45 PM *    Specimens:                Specimens       ID Source Type Tests Collected By Collected At Frozen?    A Large Intestine, Sigmoid Colon Polyp TISSUE PATHOLOGY EXAM   Conrad Garza MD 4/17/24 1443                   Drains: * No LDAs found *    Findings: Colon to TI good prep  Sigmoid Diverticulosis  Polyp-Biopsy        Complications: none          Conrad Garza MD     Date: 4/17/2024  Time: 14:47 EDT

## 2024-04-17 NOTE — ANESTHESIA PREPROCEDURE EVALUATION
Anesthesia Evaluation     Patient summary reviewed and Nursing notes reviewed   NPO Solid Status: > 8 hours  NPO Liquid Status: > 4 hours           Airway   Mallampati: II  TM distance: >3 FB  Neck ROM: full  No difficulty expected  Dental - normal exam     Pulmonary    (+) asthma,  Cardiovascular - negative cardio ROS and normal exam        Neuro/Psych- negative ROS  GI/Hepatic/Renal/Endo    (+) morbid obesity  (-) no thyroid disorder    Musculoskeletal     Abdominal   (+) obese   Substance History - negative use     OB/GYN negative ob/gyn ROS         Other   arthritis,                 Anesthesia Plan    ASA 2     MAC   total IV anesthesia  intravenous induction     Anesthetic plan, risks, benefits, and alternatives have been provided, discussed and informed consent has been obtained with: patient.    Use of blood products discussed with patient  Consented to blood products.      CODE STATUS:

## 2024-04-19 LAB
LAB AP CASE REPORT: NORMAL
PATH REPORT.FINAL DX SPEC: NORMAL
PATH REPORT.GROSS SPEC: NORMAL

## 2024-05-13 ENCOUNTER — OFFICE VISIT (OUTPATIENT)
Dept: FAMILY MEDICINE CLINIC | Facility: CLINIC | Age: 64
End: 2024-05-13
Payer: COMMERCIAL

## 2024-05-13 VITALS
SYSTOLIC BLOOD PRESSURE: 130 MMHG | WEIGHT: 256 LBS | DIASTOLIC BLOOD PRESSURE: 82 MMHG | OXYGEN SATURATION: 97 % | HEIGHT: 63 IN | HEART RATE: 73 BPM | BODY MASS INDEX: 45.36 KG/M2 | TEMPERATURE: 98.1 F

## 2024-05-13 DIAGNOSIS — E55.9 VITAMIN D DEFICIENCY: ICD-10-CM

## 2024-05-13 DIAGNOSIS — R53.83 FATIGUE, UNSPECIFIED TYPE: ICD-10-CM

## 2024-05-13 DIAGNOSIS — D50.9 IRON DEFICIENCY ANEMIA, UNSPECIFIED IRON DEFICIENCY ANEMIA TYPE: ICD-10-CM

## 2024-05-13 DIAGNOSIS — E66.01 CLASS 3 SEVERE OBESITY DUE TO EXCESS CALORIES WITH BODY MASS INDEX (BMI) OF 45.0 TO 49.9 IN ADULT, UNSPECIFIED WHETHER SERIOUS COMORBIDITY PRESENT: Primary | ICD-10-CM

## 2024-05-13 DIAGNOSIS — R73.03 PREDIABETES: ICD-10-CM

## 2024-05-13 DIAGNOSIS — K13.79 MOUTH SORES: ICD-10-CM

## 2024-05-13 PROCEDURE — 99214 OFFICE O/P EST MOD 30 MIN: CPT | Performed by: NURSE PRACTITIONER

## 2024-05-13 RX ORDER — MULTIVITAMIN/IRON/FOLIC ACID 18MG-0.4MG
1500 TABLET ORAL DAILY
COMMUNITY
Start: 2024-04-01

## 2024-05-13 RX ORDER — MELATONIN
1000 DAILY
COMMUNITY

## 2024-05-13 NOTE — PROGRESS NOTES
Patient ID: Melissa Hood is a 63 y.o. female     Patient Care Team:  Head, MEREDITH Soliman as PCP - General (Nurse Practitioner)    Subjective     Chief Complaint   Patient presents with    Weight Loss    Follow-up       History of Present Illness    Melissa Hood presents to [facility] today for continued maintenance on weight loss medication.  She has been taking Saxenda.  Tolerating without any adverse effects.  She has lost around 35 pounds since initially starting on injectable weight loss medication in March 2023.  She was on Wegovy however switch back to Saxenda due to availability.  Interested in switching back to weekly injections if able.  She also needs history and physical form completed for insurance.  She has had issues with fatigue however she has had increased stressors which can be contributing to this.  However she has had problems with low iron in the past.    She denies any complaints of fever, chills, cough, chest pain, shortness of air, abdominal pain, nausea, or any other concerns.     The following portions of the patient's history were reviewed and updated as appropriate: allergies, current medications, past family history, past medical history, past social history, past surgical history and problem list.     Health Habits:  Dental Exam: Up to date.  Found out she had 2 pin holes on back of gums on both sides.  Possible autoimmune.  Dentist request testing prior to completing biopsy.  Eye Exam: Up to date - Retina tear surgery January.  Doing well.  Diet: Weight Watchers and Saxenda.    Exercise: 3 times/week.  Current exercise activities include: walking and [employer].  Pap:  IGP,rfx Aptima HPV All Pth (08/21/2023 11:00)  Mammogram: Mammo Screening Digital Tomosynthesis Bilateral With CAD (03/01/2024 09:14)  Dexa: DEXA Bone Density Axial (03/01/2024 09:30)  Colonoscopy: Brief Op Note by Conrad Garza MD (04/17/2024 14:27)    ROS    Vitals:     05/13/24 1545   BP: 130/82   Pulse: 73   Temp: 98.1 °F (36.7 °C)   SpO2: 97%       Documented weights    05/13/24 1545   Weight: 116 kg (256 lb)     Body mass index is 45.35 kg/m².    Results for orders placed or performed during the hospital encounter of 04/17/24   Tissue Pathology Exam    Specimen: Large Intestine, Sigmoid Colon; Polyp   Result Value Ref Range    Case Report       Surgical Pathology Report                         Case: [id number]                                  Authorizing Provider:  Conrad Garza        Collected:           04/17/2024 02:43 PM                                 MD Eric                                                                   Ordering Location:     [facility]   Received:            04/17/2024 05:45 PM                                 OR                                                                           Pathologist:           Kailyn Hamilton MD                                                    Specimen:    Large Intestine, Sigmoid Colon                                                             Final Diagnosis       1.  Sigmoid colon, biopsy:   -Hyperplastic polyp.    [facility]      Gross Description       1. Large Intestine, Sigmoid Colon.  The specimen is received in formalin labeled with the patient's name and further designated 'large intestine sigmoid colon biopsy' is a small fragment of gray-tan tissue. The specimen is submitted for embedding as received.               Objective     Physical Exam  Constitutional:       Appearance: She is well-developed.   HENT:      Head: Normocephalic and atraumatic.      Right Ear: Tympanic membrane normal.      Left Ear: Tympanic membrane normal.      Mouth/Throat:      Pharynx: No posterior oropharyngeal erythema.   Eyes:      Extraocular Movements: Extraocular movements intact.      Pupils: Pupils are equal, round, and reactive to light.   Cardiovascular:      Rate and Rhythm: Normal rate and  regular rhythm.      Heart sounds: Normal heart sounds. No murmur heard.  Pulmonary:      Effort: Pulmonary effort is normal.      Breath sounds: Normal breath sounds. No wheezing, rhonchi or rales.   Abdominal:      Palpations: Abdomen is soft.   Musculoskeletal:         General: No tenderness. Normal range of motion.      Cervical back: Normal range of motion and neck supple.   Skin:     General: Skin is warm and dry.      Findings: No erythema or rash.   Neurological:      Mental Status: She is alert and oriented to person, place, and time.   Psychiatric:         Behavior: Behavior normal.            Assessment & Plan     Assessment/Plan     Diagnoses and all orders for this visit:    1. Class 3 severe obesity due to excess calories with body mass index (BMI) of 45.0 to 49.9 in adult, unspecified whether serious comorbidity present (Primary)  -     Lipase; Future  -     Tirzepatide-Weight Management (ZEPBOUND) 5 MG/0.5ML solution auto-injector; Inject 0.5 mL under the skin into the appropriate area as directed 1 (One) Time Per Week.  Dispense: 2 mL; Refill: 0    2. Mouth sores  -     DARNELL Direct Reflex to 11 Biomarker; Future  -     C-reactive Protein; Future  -     Rheumatoid Factor; Future  -     Sedimentation Rate; Future    3. Fatigue, unspecified type  -     CBC (No Diff); Future  -     Comprehensive Metabolic Panel; Future  -     Iron and TIBC; Future  -     Vitamin D,25-Hydroxy; Future  -     Lipase; Future    4. Vitamin D deficiency  -     Vitamin D,25-Hydroxy; Future    5. Iron deficiency anemia, unspecified iron deficiency anemia type  -     CBC (No Diff); Future  -     Iron and TIBC; Future    6. Prediabetes  -     Hemoglobin A1c; Future          Summary:  Melissa Hood needs further testing to rule out autoimmune disorder.  Will have her scheduled for a nonfasting lab appointment we will notify her of results.  Will also check vitamin D and iron levels.  Blood pressure currently stable at 130/82.   Will see if able to get her started on Zepbound injections instead of Saxenda.    If all labs stable, instructed return to office in 1 year for next annual physical.    Follow Up:  Return for Non-fasting labs and call.  .    In the meantime, instructed to contact us sooner for any problems or concerns.    Patient was given instructions and counseling regarding condition or for health maintenance advice.  Please see specific information pulled into the AVS if appropriate.          Tonya Altman, APRN  Family Medicine  [facility]  05/13/24  16:50 EDT

## 2024-05-22 DIAGNOSIS — E66.01 CLASS 3 SEVERE OBESITY DUE TO EXCESS CALORIES WITHOUT SERIOUS COMORBIDITY WITH BODY MASS INDEX (BMI) OF 45.0 TO 49.9 IN ADULT: ICD-10-CM

## 2024-05-22 RX ORDER — PEN NEEDLE, DIABETIC 32 GX 1/4"
NEEDLE, DISPOSABLE MISCELLANEOUS
Qty: 100 EACH | Refills: 3 | Status: SHIPPED | OUTPATIENT
Start: 2024-05-22

## 2024-06-03 DIAGNOSIS — E66.01 CLASS 3 SEVERE OBESITY DUE TO EXCESS CALORIES WITH BODY MASS INDEX (BMI) OF 45.0 TO 49.9 IN ADULT, UNSPECIFIED WHETHER SERIOUS COMORBIDITY PRESENT: ICD-10-CM

## 2024-06-03 RX ORDER — TIRZEPATIDE 5 MG/.5ML
INJECTION, SOLUTION SUBCUTANEOUS
Qty: 2 ML | Refills: 0 | Status: SHIPPED | OUTPATIENT
Start: 2024-06-03 | End: 2024-06-07 | Stop reason: SDUPTHER

## 2024-06-06 DIAGNOSIS — E66.01 CLASS 3 SEVERE OBESITY DUE TO EXCESS CALORIES WITH BODY MASS INDEX (BMI) OF 45.0 TO 49.9 IN ADULT, UNSPECIFIED WHETHER SERIOUS COMORBIDITY PRESENT: ICD-10-CM

## 2024-06-06 RX ORDER — TIRZEPATIDE 5 MG/.5ML
INJECTION, SOLUTION SUBCUTANEOUS
Qty: 2 ML | Refills: 0 | OUTPATIENT
Start: 2024-06-06

## 2024-06-07 DIAGNOSIS — E66.01 CLASS 3 SEVERE OBESITY DUE TO EXCESS CALORIES WITH BODY MASS INDEX (BMI) OF 45.0 TO 49.9 IN ADULT, UNSPECIFIED WHETHER SERIOUS COMORBIDITY PRESENT: ICD-10-CM

## 2024-06-07 RX ORDER — TIRZEPATIDE 5 MG/.5ML
5 INJECTION, SOLUTION SUBCUTANEOUS WEEKLY
Qty: 2 ML | Refills: 0 | Status: SHIPPED | OUTPATIENT
Start: 2024-06-07

## 2024-07-05 DIAGNOSIS — E66.01 CLASS 3 SEVERE OBESITY DUE TO EXCESS CALORIES WITH BODY MASS INDEX (BMI) OF 45.0 TO 49.9 IN ADULT, UNSPECIFIED WHETHER SERIOUS COMORBIDITY PRESENT: ICD-10-CM

## 2024-07-05 RX ORDER — TIRZEPATIDE 5 MG/.5ML
INJECTION, SOLUTION SUBCUTANEOUS
Qty: 2 ML | Refills: 0 | Status: SHIPPED | OUTPATIENT
Start: 2024-07-05

## 2024-07-29 DIAGNOSIS — E66.01 CLASS 3 SEVERE OBESITY DUE TO EXCESS CALORIES WITH BODY MASS INDEX (BMI) OF 45.0 TO 49.9 IN ADULT, UNSPECIFIED WHETHER SERIOUS COMORBIDITY PRESENT: Primary | ICD-10-CM

## 2024-08-26 ENCOUNTER — TELEPHONE (OUTPATIENT)
Dept: FAMILY MEDICINE CLINIC | Facility: CLINIC | Age: 64
End: 2024-08-26

## 2024-08-26 DIAGNOSIS — E66.01 CLASS 3 SEVERE OBESITY DUE TO EXCESS CALORIES WITH BODY MASS INDEX (BMI) OF 45.0 TO 49.9 IN ADULT, UNSPECIFIED WHETHER SERIOUS COMORBIDITY PRESENT: ICD-10-CM

## 2024-08-27 DIAGNOSIS — E78.00 ELEVATED LDL CHOLESTEROL LEVEL: ICD-10-CM

## 2024-08-27 DIAGNOSIS — E66.01 CLASS 3 SEVERE OBESITY DUE TO EXCESS CALORIES WITH BODY MASS INDEX (BMI) OF 45.0 TO 49.9 IN ADULT, UNSPECIFIED WHETHER SERIOUS COMORBIDITY PRESENT: Primary | ICD-10-CM

## 2024-08-27 DIAGNOSIS — R73.03 PREDIABETES: ICD-10-CM

## 2024-09-09 ENCOUNTER — TELEPHONE (OUTPATIENT)
Dept: FAMILY MEDICINE CLINIC | Facility: CLINIC | Age: 64
End: 2024-09-09
Payer: COMMERCIAL

## 2024-09-12 ENCOUNTER — OFFICE VISIT (OUTPATIENT)
Dept: FAMILY MEDICINE CLINIC | Facility: CLINIC | Age: 64
End: 2024-09-12
Payer: COMMERCIAL

## 2024-09-12 VITALS
TEMPERATURE: 97.8 F | BODY MASS INDEX: 45.36 KG/M2 | HEART RATE: 64 BPM | OXYGEN SATURATION: 99 % | DIASTOLIC BLOOD PRESSURE: 78 MMHG | SYSTOLIC BLOOD PRESSURE: 120 MMHG | WEIGHT: 256 LBS | HEIGHT: 63 IN

## 2024-09-12 DIAGNOSIS — Z01.818 PREOPERATIVE CLEARANCE: Primary | ICD-10-CM

## 2024-09-12 DIAGNOSIS — M17.11 OSTEOARTHRITIS OF RIGHT KNEE, UNSPECIFIED OSTEOARTHRITIS TYPE: ICD-10-CM

## 2024-09-12 DIAGNOSIS — M54.9 ACUTE RIGHT-SIDED BACK PAIN, UNSPECIFIED BACK LOCATION: ICD-10-CM

## 2024-09-12 DIAGNOSIS — Z23 FLU VACCINE NEED: ICD-10-CM

## 2024-09-12 PROCEDURE — 90471 IMMUNIZATION ADMIN: CPT | Performed by: NURSE PRACTITIONER

## 2024-09-12 PROCEDURE — 99213 OFFICE O/P EST LOW 20 MIN: CPT | Performed by: NURSE PRACTITIONER

## 2024-09-12 PROCEDURE — 90656 IIV3 VACC NO PRSV 0.5 ML IM: CPT | Performed by: NURSE PRACTITIONER

## 2024-09-12 RX ORDER — CYCLOBENZAPRINE HCL 10 MG
10 TABLET ORAL 3 TIMES DAILY PRN
Qty: 30 TABLET | Refills: 0 | Status: SHIPPED | OUTPATIENT
Start: 2024-09-12

## 2024-09-12 NOTE — PROGRESS NOTES
Answers submitted by the patient for this visit:  Other (Submitted on 9/10/2024)  Please describe your symptoms.: Surgery clearance  Have you had these symptoms before?: No  How long have you been having these symptoms?: Greater than 2 weeks  Please list any medications you are currently taking for this condition.: vitamins  Please describe any probable cause for these symptoms. : Knee Pain  Primary Reason for Visit (Submitted on 9/10/2024)  What is the primary reason for your visit?: Problem Not Listed    Patient ID: Melissa Hood is a 63 y.o. female     Patient Care Team:  Head, MEREDITH Soliman as PCP - General (Nurse Practitioner)    Subjective     Chief Complaint   Patient presents with    Surgical Clearance     Total right knee/ may and gwyn.        History of Present Illness    Melissa Hood presents to [facility] today for surgery clearance.  Upcoming right knee replacement.  She has underwent preadmission testing.  Denies any new problems or concerns except flare-up of right sided back pain.  Feels related to overcompensating due to right knee pain.  She has been using heating pad with some relief.    Right knee:  Pain with ambulating up to a 6 on 0-10 scale.  No pain with sitting.      She denies any complaints of fever, chills, cough, chest pain, shortness of air, abdominal pain, nausea, or any other concerns.     The following portions of the patient's history were reviewed and updated as appropriate: allergies, current medications, past family history, past medical history, past social history, past surgical history and problem list.       ROS    Vitals:    09/12/24 1351   BP: 120/78   Pulse: 64   Temp: 97.8 °F (36.6 °C)   SpO2: 99%       Documented weights    09/12/24 1351   Weight: 116 kg (256 lb)     Body mass index is 45.35 kg/m².    Results for orders placed or performed in visit on 05/14/24   CBC (No Diff)    Specimen: Blood   Result Value Ref Range    WBC  5.95 3.40 - 10.80 10*3/mm3    RBC 4.83 3.77 - 5.28 10*6/mm3    Hemoglobin 13.0 12.0 - 15.9 g/dL    Hematocrit 40.7 34.0 - 46.6 %    MCV 84.3 79.0 - 97.0 fL    MCH 26.9 26.6 - 33.0 pg    MCHC 31.9 31.5 - 35.7 g/dL    RDW 13.6 12.3 - 15.4 %    Platelets 281 140 - 450 10*3/mm3   Comprehensive Metabolic Panel    Specimen: Blood   Result Value Ref Range    Glucose 93 65 - 99 mg/dL    BUN 15 8 - 23 mg/dL    Creatinine 0.56 (L) 0.57 - 1.00 mg/dL    EGFR Result 102.1 >60.0 mL/min/1.73    BUN/Creatinine Ratio 26.8 (H) 7.0 - 25.0    Sodium 141 136 - 145 mmol/L    Potassium 4.3 3.5 - 5.2 mmol/L    Chloride 103 98 - 107 mmol/L    Total CO2 25.6 22.0 - 29.0 mmol/L    Calcium 9.2 8.6 - 10.5 mg/dL    Total Protein 6.2 6.0 - 8.5 g/dL    Albumin 3.9 3.5 - 5.2 g/dL    Globulin 2.3 gm/dL    A/G Ratio 1.7 g/dL    Total Bilirubin 0.3 0.0 - 1.2 mg/dL    Alkaline Phosphatase 112 39 - 117 U/L    AST (SGOT) 19 1 - 32 U/L    ALT (SGPT) 18 1 - 33 U/L   Hemoglobin A1c    Specimen: Blood   Result Value Ref Range    Hemoglobin A1C 5.50 4.80 - 5.60 %   Iron and TIBC    Specimen: Blood   Result Value Ref Range    TIBC 474 mcg/dL    UIBC 369 (H) 112 - 346 mcg/dL    Iron 105 37 - 145 mcg/dL    Iron Saturation 22 20 - 50 %   DARNELL Direct Reflex to 11 Biomarker    Specimen: Blood   Result Value Ref Range    DARNELL Direct Negative Negative   C-reactive Protein    Specimen: Blood   Result Value Ref Range    C-Reactive Protein <0.30 0.00 - 0.50 mg/dL   Rheumatoid Factor    Specimen: Blood   Result Value Ref Range    RA Latex Turbid <10.0 <14.0 IU/mL   Sedimentation Rate    Specimen: Blood   Result Value Ref Range    Sed Rate 8 0 - 30 mm/hr   Vitamin D,25-Hydroxy    Specimen: Blood   Result Value Ref Range    25 Hydroxy, Vitamin D 34.1 30.0 - 100.0 ng/ml   Lipase    Specimen: Blood   Result Value Ref Range    Lipase 33 13 - 60 U/L           Objective     Physical Exam  Vitals reviewed.   Constitutional:       General: She is not in acute distress.  HENT:       Head: Normocephalic and atraumatic.   Cardiovascular:      Rate and Rhythm: Normal rate and regular rhythm.      Heart sounds: No murmur heard.  Pulmonary:      Effort: Pulmonary effort is normal.      Breath sounds: Normal breath sounds. No wheezing.   Musculoskeletal:      Lumbar back: Tenderness present.      Right knee: Crepitus present. Decreased range of motion. Tenderness present over the lateral joint line.      Left knee: Normal.      Right lower leg: No edema.      Left lower leg: No edema.   Neurological:      Mental Status: She is alert.         Assessment & Plan     Assessment/Plan     Diagnoses and all orders for this visit:    1. Preoperative clearance (Primary)   Reviewed recent labs and EKG which are all stable.  Cleared for surgery per medical standpoint.      2. Acute right-sided back pain, unspecified back location  -     cyclobenzaprine (FLEXERIL) 10 MG tablet; Take 1 tablet by mouth 3 (Three) Times a Day As Needed for Muscle Spasms.  Dispense: 30 tablet; Refill: 0   Rest, heating pad.  Tylenol or ibuprofen as needed.      3. Flu vaccine need  -     Fluzone >6mos ([id number])    Follow Up:  Return if symptoms worsen or fail to improve.    In the meantime, instructed to contact us sooner for any problems or concerns.    Patient was given instructions and counseling regarding condition or for health maintenance advice.  Please see specific information pulled into the AVS if appropriate.          Tonya Altman, APRN  Family Medicine  [facility] [place]  09/12/24  14:19 EDT

## 2024-10-01 ENCOUNTER — TELEPHONE (OUTPATIENT)
Dept: FAMILY MEDICINE CLINIC | Facility: CLINIC | Age: 64
End: 2024-10-01
Payer: COMMERCIAL

## 2024-10-01 DIAGNOSIS — E66.01 CLASS 3 SEVERE OBESITY DUE TO EXCESS CALORIES WITH BODY MASS INDEX (BMI) OF 45.0 TO 49.9 IN ADULT, UNSPECIFIED WHETHER SERIOUS COMORBIDITY PRESENT: ICD-10-CM

## 2024-10-01 DIAGNOSIS — E66.813 CLASS 3 SEVERE OBESITY DUE TO EXCESS CALORIES WITH BODY MASS INDEX (BMI) OF 45.0 TO 49.9 IN ADULT, UNSPECIFIED WHETHER SERIOUS COMORBIDITY PRESENT: ICD-10-CM

## 2024-10-01 DIAGNOSIS — E78.00 ELEVATED LDL CHOLESTEROL LEVEL: ICD-10-CM

## 2024-10-01 DIAGNOSIS — R73.03 PREDIABETES: ICD-10-CM

## 2024-10-01 NOTE — TELEPHONE ENCOUNTER
Caller: Melissa Hood    Relationship: Self    Best call back number: [phone]      What was the call regarding: PATIENT IS CALLING WANTING TO FOLLOW UP WITH THE REFILL FOR Tirzepatide-Weight Management (ZEPBOUND) 10 MG/0.5ML solution auto-injector

## 2024-10-22 DIAGNOSIS — E66.813 CLASS 3 SEVERE OBESITY DUE TO EXCESS CALORIES WITH BODY MASS INDEX (BMI) OF 45.0 TO 49.9 IN ADULT, UNSPECIFIED WHETHER SERIOUS COMORBIDITY PRESENT: Primary | ICD-10-CM

## 2024-10-22 DIAGNOSIS — E66.01 CLASS 3 SEVERE OBESITY DUE TO EXCESS CALORIES WITH BODY MASS INDEX (BMI) OF 45.0 TO 49.9 IN ADULT, UNSPECIFIED WHETHER SERIOUS COMORBIDITY PRESENT: Primary | ICD-10-CM

## 2024-11-04 DIAGNOSIS — R73.03 PREDIABETES: ICD-10-CM

## 2024-11-04 DIAGNOSIS — E66.01 CLASS 3 SEVERE OBESITY DUE TO EXCESS CALORIES WITH BODY MASS INDEX (BMI) OF 45.0 TO 49.9 IN ADULT, UNSPECIFIED WHETHER SERIOUS COMORBIDITY PRESENT: ICD-10-CM

## 2024-11-04 DIAGNOSIS — E66.813 CLASS 3 SEVERE OBESITY DUE TO EXCESS CALORIES WITH BODY MASS INDEX (BMI) OF 45.0 TO 49.9 IN ADULT, UNSPECIFIED WHETHER SERIOUS COMORBIDITY PRESENT: ICD-10-CM

## 2024-11-04 DIAGNOSIS — E78.00 ELEVATED LDL CHOLESTEROL LEVEL: ICD-10-CM

## 2024-11-04 RX ORDER — TIRZEPATIDE 10 MG/.5ML
INJECTION, SOLUTION SUBCUTANEOUS
Qty: 2 ML | Refills: 0 | OUTPATIENT
Start: 2024-11-04

## 2024-11-15 DIAGNOSIS — E66.813 CLASS 3 SEVERE OBESITY DUE TO EXCESS CALORIES WITH BODY MASS INDEX (BMI) OF 45.0 TO 49.9 IN ADULT, UNSPECIFIED WHETHER SERIOUS COMORBIDITY PRESENT: ICD-10-CM

## 2024-11-15 DIAGNOSIS — E66.01 CLASS 3 SEVERE OBESITY DUE TO EXCESS CALORIES WITH BODY MASS INDEX (BMI) OF 45.0 TO 49.9 IN ADULT, UNSPECIFIED WHETHER SERIOUS COMORBIDITY PRESENT: ICD-10-CM

## 2024-11-21 DIAGNOSIS — E66.813 CLASS 3 SEVERE OBESITY DUE TO EXCESS CALORIES WITH BODY MASS INDEX (BMI) OF 45.0 TO 49.9 IN ADULT, UNSPECIFIED WHETHER SERIOUS COMORBIDITY PRESENT: Primary | ICD-10-CM

## 2024-11-21 DIAGNOSIS — E66.01 CLASS 3 SEVERE OBESITY DUE TO EXCESS CALORIES WITH BODY MASS INDEX (BMI) OF 45.0 TO 49.9 IN ADULT, UNSPECIFIED WHETHER SERIOUS COMORBIDITY PRESENT: Primary | ICD-10-CM

## 2024-12-16 DIAGNOSIS — E66.813 CLASS 3 SEVERE OBESITY DUE TO EXCESS CALORIES WITH BODY MASS INDEX (BMI) OF 45.0 TO 49.9 IN ADULT, UNSPECIFIED WHETHER SERIOUS COMORBIDITY PRESENT: ICD-10-CM

## 2024-12-16 DIAGNOSIS — E66.01 CLASS 3 SEVERE OBESITY DUE TO EXCESS CALORIES WITH BODY MASS INDEX (BMI) OF 45.0 TO 49.9 IN ADULT, UNSPECIFIED WHETHER SERIOUS COMORBIDITY PRESENT: ICD-10-CM

## 2025-01-10 ENCOUNTER — OFFICE VISIT (OUTPATIENT)
Dept: FAMILY MEDICINE CLINIC | Facility: CLINIC | Age: 65
End: 2025-01-10
Payer: COMMERCIAL

## 2025-01-10 VITALS
SYSTOLIC BLOOD PRESSURE: 120 MMHG | OXYGEN SATURATION: 100 % | BODY MASS INDEX: 40.4 KG/M2 | HEART RATE: 75 BPM | DIASTOLIC BLOOD PRESSURE: 80 MMHG | WEIGHT: 228 LBS | TEMPERATURE: 97.5 F | HEIGHT: 63 IN

## 2025-01-10 DIAGNOSIS — E66.813 CLASS 3 SEVERE OBESITY DUE TO EXCESS CALORIES WITHOUT SERIOUS COMORBIDITY WITH BODY MASS INDEX (BMI) OF 40.0 TO 44.9 IN ADULT: ICD-10-CM

## 2025-01-10 DIAGNOSIS — E66.01 CLASS 3 SEVERE OBESITY DUE TO EXCESS CALORIES WITHOUT SERIOUS COMORBIDITY WITH BODY MASS INDEX (BMI) OF 40.0 TO 44.9 IN ADULT: ICD-10-CM

## 2025-01-10 DIAGNOSIS — D50.9 IRON DEFICIENCY ANEMIA, UNSPECIFIED IRON DEFICIENCY ANEMIA TYPE: ICD-10-CM

## 2025-01-10 DIAGNOSIS — Z23 IMMUNIZATION DUE: ICD-10-CM

## 2025-01-10 DIAGNOSIS — E55.9 VITAMIN D DEFICIENCY: ICD-10-CM

## 2025-01-10 DIAGNOSIS — Z00.00 ANNUAL PHYSICAL EXAM: Primary | ICD-10-CM

## 2025-01-10 DIAGNOSIS — Z12.31 ENCOUNTER FOR SCREENING MAMMOGRAM FOR MALIGNANT NEOPLASM OF BREAST: ICD-10-CM

## 2025-01-10 DIAGNOSIS — R73.03 PREDIABETES: ICD-10-CM

## 2025-01-10 PROCEDURE — 90677 PCV20 VACCINE IM: CPT | Performed by: NURSE PRACTITIONER

## 2025-01-10 PROCEDURE — 90471 IMMUNIZATION ADMIN: CPT | Performed by: NURSE PRACTITIONER

## 2025-01-10 PROCEDURE — 99397 PER PM REEVAL EST PAT 65+ YR: CPT | Performed by: NURSE PRACTITIONER

## 2025-01-10 NOTE — PROGRESS NOTES
Chief Complaint   Patient presents with    Annual Exam     Fasting for labs     Obesity       Patient Care Team:  Head, MEREDITH Soliman as PCP - General (Nurse Practitioner)    Subjective   Melissa Hood is a 64 y.o. female and is here for a yearly physical exam. The patient reports no problems.    History of Present Illness  The patient is a 64-year-old female who presents for a physical exam.    She reports overall good health, with no new issues or concerns. She maintains a diet primarily focused on portion control. She does not experience any gastrointestinal symptoms such as nausea, vomiting, diarrhea, or constipation. She also reports no abdominal pain. Her physical activity is limited to walking due to her recent knee surgery. She is currently on calcium and vitamin D supplements. She has not received any additional COVID-19 vaccines. She is scheduled to retire at the age of 65 and plans to relocate to [place] post-residential.    She has been experiencing intermittent flashes of light in her right eye, which has been a persistent issue. Given her history of retinal surgery for a torn retina last year, she acknowledges the need for an ophthalmological evaluation.    She has a history of prediabetes, low vitamin D, and low iron levels.    She has been managing her weight effectively with Zepbound, having lost approximately 40 pounds. She anticipates requiring a refill of Zepbound on the upcoming Tuesday.    She underwent successful knee surgery.      Supplemental Information  She maintains her dental health with biannual visits to the dentist.    FAMILY HISTORY  Her father has Alzheimer's disease. Her mother has dementia.    MEDICATIONS  Current: Calcium and vitamin D supplement, Zepbound.    IMMUNIZATIONS  She received the Prevnar 13 pneumonia vaccine in 2019.    Results      Health Habits:  Dental Exam: Up to date  Eye Exam: Up to date  Diet: Portion control  Exercise:   Current exercise activities include:  "walking  Pap:  IGP,rfx Aptima HPV All Pth (08/21/2023 11:00)  Mammogram: Mammo Screening Digital Tomosynthesis Bilateral With CAD (03/01/2024 09:14)  Dexa:DEXA Bone Density Axial (03/01/2024 09:30)  Colonoscopy:ENDOSCOPY - COLON (04/17/2024)    The following portions of the patient's history were reviewed and updated as appropriate: allergies, current medications, past family history, past medical history, past social history, past surgical history, and problem list.    Social and Family and Surgical History reviewed and updated today, see Rooming tab.    Health History, Preventive Measures and Vaccination flow sheets reviewed and updated today.    Patient's current medical chart in Epic; including previous office notes, imaging, labs, specialist's evaluation either in notes or in Media tab reviewed today.    Other pertinent medical information also reviewed thru Care Everywhere function is also reviewed today.    Review of Systems  Review of Systems  Vitals:    01/10/25 0922   BP: 120/80   BP Location: Left arm   Patient Position: Sitting   Cuff Size: Large Adult   Pulse: 75   Temp: 97.5 °F (36.4 °C)   SpO2: 100%   Weight: 103 kg (228 lb)   Height: 160 cm (63\")     Wt Readings from Last 3 Encounters:   01/10/25 103 kg (228 lb)   09/12/24 116 kg (256 lb)   05/13/24 116 kg (256 lb)       Physical Exam  Constitutional:       Appearance: She is well-developed.   HENT:      Head: Normocephalic and atraumatic.      Right Ear: Tympanic membrane normal.      Left Ear: Tympanic membrane normal.      Nose: No rhinorrhea.      Mouth/Throat:      Pharynx: No posterior oropharyngeal erythema.   Eyes:      Extraocular Movements: Extraocular movements intact.      Pupils: Pupils are equal, round, and reactive to light.   Cardiovascular:      Rate and Rhythm: Normal rate and regular rhythm.      Heart sounds: Normal heart sounds. No murmur heard.  Pulmonary:      Effort: Pulmonary effort is normal.      Breath sounds: Normal " breath sounds. No wheezing, rhonchi or rales.   Abdominal:      Palpations: Abdomen is soft.   Musculoskeletal:         General: No tenderness. Normal range of motion.      Cervical back: Normal range of motion and neck supple.   Lymphadenopathy:      Cervical: No cervical adenopathy.   Skin:     General: Skin is warm and dry.      Findings: No erythema or rash.   Neurological:      Mental Status: She is alert and oriented to person, place, and time.      Cranial Nerves: No cranial nerve deficit.   Psychiatric:         Mood and Affect: Mood normal.         Behavior: Behavior normal.         Thought Content: Thought content normal.            The 10-year ASCVD risk score (Nils VARGAS, et al., 2019) is: 4.6%    Values used to calculate the score:      Age: 64 years      Sex: Female      Is Non- : No      Diabetic: No      Tobacco smoker: No      Systolic Blood Pressure: 120 mmHg      Is BP treated: No      HDL Cholesterol: 53 mg/dL      Total Cholesterol: 169 mg/dL     Results for orders placed or performed in visit on 05/14/24   CBC (No Diff)    Collection Time: 05/15/24 11:35 AM    Specimen: Blood   Result Value Ref Range    WBC 5.95 3.40 - 10.80 10*3/mm3    RBC 4.83 3.77 - 5.28 10*6/mm3    Hemoglobin 13.0 12.0 - 15.9 g/dL    Hematocrit 40.7 34.0 - 46.6 %    MCV 84.3 79.0 - 97.0 fL    MCH 26.9 26.6 - 33.0 pg    MCHC 31.9 31.5 - 35.7 g/dL    RDW 13.6 12.3 - 15.4 %    Platelets 281 140 - 450 10*3/mm3   Comprehensive Metabolic Panel    Collection Time: 05/15/24 11:35 AM    Specimen: Blood   Result Value Ref Range    Glucose 93 65 - 99 mg/dL    BUN 15 8 - 23 mg/dL    Creatinine 0.56 (L) 0.57 - 1.00 mg/dL    EGFR Result 102.1 >60.0 mL/min/1.73    BUN/Creatinine Ratio 26.8 (H) 7.0 - 25.0    Sodium 141 136 - 145 mmol/L    Potassium 4.3 3.5 - 5.2 mmol/L    Chloride 103 98 - 107 mmol/L    Total CO2 25.6 22.0 - 29.0 mmol/L    Calcium 9.2 8.6 - 10.5 mg/dL    Total Protein 6.2 6.0 - 8.5 g/dL    Albumin 3.9  3.5 - 5.2 g/dL    Globulin 2.3 gm/dL    A/G Ratio 1.7 g/dL    Total Bilirubin 0.3 0.0 - 1.2 mg/dL    Alkaline Phosphatase 112 39 - 117 U/L    AST (SGOT) 19 1 - 32 U/L    ALT (SGPT) 18 1 - 33 U/L   Hemoglobin A1c    Collection Time: 05/15/24 11:35 AM    Specimen: Blood   Result Value Ref Range    Hemoglobin A1C 5.50 4.80 - 5.60 %   Iron and TIBC    Collection Time: 05/15/24 11:35 AM    Specimen: Blood   Result Value Ref Range    TIBC 474 mcg/dL    UIBC 369 (H) 112 - 346 mcg/dL    Iron 105 37 - 145 mcg/dL    Iron Saturation 22 20 - 50 %   DARNELL Direct Reflex to 11 Biomarker    Collection Time: 05/15/24 11:35 AM    Specimen: Blood   Result Value Ref Range    DARNELL Direct Negative Negative   C-reactive Protein    Collection Time: 05/15/24 11:35 AM    Specimen: Blood   Result Value Ref Range    C-Reactive Protein <0.30 0.00 - 0.50 mg/dL   Rheumatoid Factor    Collection Time: 05/15/24 11:35 AM    Specimen: Blood   Result Value Ref Range    RA Latex Turbid <10.0 <14.0 IU/mL   Sedimentation Rate    Collection Time: 05/15/24 11:35 AM    Specimen: Blood   Result Value Ref Range    Sed Rate 8 0 - 30 mm/hr   Vitamin D,25-Hydroxy    Collection Time: 05/15/24 11:35 AM    Specimen: Blood   Result Value Ref Range    25 Hydroxy, Vitamin D 34.1 30.0 - 100.0 ng/ml   Lipase    Collection Time: 05/15/24 11:35 AM    Specimen: Blood   Result Value Ref Range    Lipase 33 13 - 60 U/L     Assessment & Plan     Assessment & Plan  1. Health maintenance.  Her Pap smear conducted in 2023 remains valid until 2026. She is due for her next mammogram in March 2025. A bone density scan performed in March 2024 revealed osteopenia, necessitating a repeat scan after 2 years. Her colonoscopy was completed in April 2024. She has a history of prediabetes, low vitamin D, and low iron levels. A comprehensive lab workup will be ordered today, including hemoglobin A1c, vitamin D, iron, cholesterol, and CBC. She will receive the Prevnar 20 vaccine today.    2.  Right eye flashing light.  She reports intermittent flashing lights in her right eye, which has been present for a while. Given her history of retinal surgery for a torn retina last year, it is recommended that she see an eye doctor for further evaluation.    3. Weight management.  She has lost 40 to 50 pounds and is doing well on Zepbound. A refill for Zepbound will be provided.    4. Post-knee surgery recovery.  She reports some aches following her knee surgery but overall is doing well. No swelling in the legs was noted during the physical exam.    Follow-up  The patient will follow up in 1 year, contingent upon satisfactory lab results.    PROCEDURE  The patient underwent knee surgery recently.    Diagnoses and all orders for this visit:    1. Annual physical exam (Primary)  -     CBC (No Diff)  -     Comprehensive Metabolic Panel  -     Lipid Panel With / Chol / HDL Ratio  -     TSH Rfx On Abnormal To Free T4    2. Vitamin D deficiency  -     Vitamin D,25-Hydroxy    3. Prediabetes  -     Hemoglobin A1c    4. Iron deficiency anemia, unspecified iron deficiency anemia type  -     Iron and TIBC    5. Immunization due  -     Pneumococcal Conjugate Vaccine 20-Valent (PCV20)    6. Class 3 severe obesity due to excess calories without serious comorbidity with body mass index (BMI) of 40.0 to 44.9 in adult  -     Tirzepatide-Weight Management (ZEPBOUND) 15 MG/0.5ML solution auto-injector; Inject 0.5 mL under the skin into the appropriate area as directed 1 (One) Time Per Week.  Dispense: 2 mL; Refill: 0    7. Encounter for screening mammogram for malignant neoplasm of breast  -     Mammo screening digital tomosynthesis bilateral w CAD; Future        1. Patient Counseling:  --Nutrition: Stressed importance of moderation in sodium/caffeine intake, saturated fat and cholesterol.  Discussed caloric balance, sufficient intake of fresh fruits, vegetables, fiber,    calcium, iron.  --Exercise: Stressed the importance of  regular exercise.   --Substance Abuse: Discussed cessation/primary prevention of tobacco, alcohol, or other drug use; driving or other dangerous activities under the influence.    --Dental health: Discussed importance of regular tooth brushing, flossing, and dental visits.  --Suggested having eyes and vision checked if needed or past due.  --Immunizations reviewed.  --Discussed benefits of screening colonoscopy.    2. Discussed the patient's BMI with her.  The BMI is above average; BMI management plan is completed  3. Follow up next physical in 1 year    Patient was given instructions and counseling regarding condition or for health maintenance advice.  Please see specific information pulled into the AVS if appropriate.      Medications Discontinued During This Encounter   Medication Reason    cyclobenzaprine (FLEXERIL) 10 MG tablet *Therapy completed    Tirzepatide-Weight Management (ZEPBOUND) 15 MG/0.5ML solution auto-injector Reorder          Patient or patient representative verbalized consent for the use of Ambient Listening during the visit with  MEREDITH Galindo for chart documentation. 1/10/2025  09:46 EST    MEREDITH Kimble  Family Practice  [facility] [place]

## 2025-01-11 LAB
25(OH)D3+25(OH)D2 SERPL-MCNC: 35.3 NG/ML (ref 30–100)
ALBUMIN SERPL-MCNC: 4.1 G/DL (ref 3.5–5.2)
ALBUMIN/GLOB SERPL: 1.6 G/DL
ALP SERPL-CCNC: 113 U/L (ref 39–117)
ALT SERPL-CCNC: 15 U/L (ref 1–33)
AST SERPL-CCNC: 17 U/L (ref 1–32)
BILIRUB SERPL-MCNC: 0.4 MG/DL (ref 0–1.2)
BUN SERPL-MCNC: 10 MG/DL (ref 8–23)
BUN/CREAT SERPL: 18.2 (ref 7–25)
CALCIUM SERPL-MCNC: 9.5 MG/DL (ref 8.6–10.5)
CHLORIDE SERPL-SCNC: 103 MMOL/L (ref 98–107)
CHOLEST SERPL-MCNC: 151 MG/DL (ref 0–200)
CHOLEST/HDLC SERPL: 3.02 {RATIO}
CO2 SERPL-SCNC: 28.4 MMOL/L (ref 22–29)
CREAT SERPL-MCNC: 0.55 MG/DL (ref 0.57–1)
EGFRCR SERPLBLD CKD-EPI 2021: 101.9 ML/MIN/1.73
ERYTHROCYTE [DISTWIDTH] IN BLOOD BY AUTOMATED COUNT: 13.1 % (ref 12.3–15.4)
GLOBULIN SER CALC-MCNC: 2.6 GM/DL
GLUCOSE SERPL-MCNC: 97 MG/DL (ref 65–99)
HBA1C MFR BLD: 5.3 % (ref 4.8–5.6)
HCT VFR BLD AUTO: 42.7 % (ref 34–46.6)
HDLC SERPL-MCNC: 50 MG/DL (ref 40–60)
HGB BLD-MCNC: 13.9 G/DL (ref 12–15.9)
IRON SATN MFR SERPL: 11 % (ref 20–50)
IRON SERPL-MCNC: 50 MCG/DL (ref 37–145)
LDLC SERPL CALC-MCNC: 86 MG/DL (ref 0–100)
MCH RBC QN AUTO: 27.9 PG (ref 26.6–33)
MCHC RBC AUTO-ENTMCNC: 32.6 G/DL (ref 31.5–35.7)
MCV RBC AUTO: 85.6 FL (ref 79–97)
PLATELET # BLD AUTO: 331 10*3/MM3 (ref 140–450)
POTASSIUM SERPL-SCNC: 4.2 MMOL/L (ref 3.5–5.2)
PROT SERPL-MCNC: 6.7 G/DL (ref 6–8.5)
RBC # BLD AUTO: 4.99 10*6/MM3 (ref 3.77–5.28)
SODIUM SERPL-SCNC: 141 MMOL/L (ref 136–145)
TIBC SERPL-MCNC: 444 MCG/DL
TRIGL SERPL-MCNC: 80 MG/DL (ref 0–150)
TSH SERPL DL<=0.005 MIU/L-ACNC: 2.52 UIU/ML (ref 0.27–4.2)
UIBC SERPL-MCNC: 394 MCG/DL (ref 112–346)
VLDLC SERPL CALC-MCNC: 15 MG/DL (ref 5–40)
WBC # BLD AUTO: 5.87 10*3/MM3 (ref 3.4–10.8)

## 2025-01-14 DIAGNOSIS — E66.01 CLASS 3 SEVERE OBESITY DUE TO EXCESS CALORIES WITHOUT SERIOUS COMORBIDITY WITH BODY MASS INDEX (BMI) OF 40.0 TO 44.9 IN ADULT: ICD-10-CM

## 2025-01-14 DIAGNOSIS — E66.813 CLASS 3 SEVERE OBESITY DUE TO EXCESS CALORIES WITHOUT SERIOUS COMORBIDITY WITH BODY MASS INDEX (BMI) OF 40.0 TO 44.9 IN ADULT: ICD-10-CM

## 2025-01-14 NOTE — TELEPHONE ENCOUNTER
Patient comment: My insurance has approved this and actually says I can get a 3 month supply for a lot cheaper, can you send a new prescription to [facility]?

## 2025-02-08 DIAGNOSIS — E66.813 CLASS 3 SEVERE OBESITY DUE TO EXCESS CALORIES WITHOUT SERIOUS COMORBIDITY WITH BODY MASS INDEX (BMI) OF 40.0 TO 44.9 IN ADULT: ICD-10-CM

## 2025-02-08 DIAGNOSIS — E66.01 CLASS 3 SEVERE OBESITY DUE TO EXCESS CALORIES WITHOUT SERIOUS COMORBIDITY WITH BODY MASS INDEX (BMI) OF 40.0 TO 44.9 IN ADULT: ICD-10-CM

## 2025-02-10 RX ORDER — TIRZEPATIDE 15 MG/.5ML
INJECTION, SOLUTION SUBCUTANEOUS
Qty: 2 ML | OUTPATIENT
Start: 2025-02-10

## 2025-03-07 ENCOUNTER — HOSPITAL ENCOUNTER (OUTPATIENT)
Dept: MAMMOGRAPHY | Facility: HOSPITAL | Age: 65
Discharge: HOME | End: 2025-03-07
Admitting: NURSE PRACTITIONER
Payer: COMMERCIAL

## 2025-03-07 DIAGNOSIS — Z12.31 ENCOUNTER FOR SCREENING MAMMOGRAM FOR MALIGNANT NEOPLASM OF BREAST: ICD-10-CM

## 2025-03-07 PROCEDURE — 77063 BREAST TOMOSYNTHESIS BI: CPT

## 2025-03-07 PROCEDURE — 77067 SCR MAMMO BI INCL CAD: CPT

## 2025-04-30 DIAGNOSIS — E66.01 CLASS 3 SEVERE OBESITY DUE TO EXCESS CALORIES WITHOUT SERIOUS COMORBIDITY WITH BODY MASS INDEX (BMI) OF 40.0 TO 44.9 IN ADULT: ICD-10-CM

## 2025-04-30 DIAGNOSIS — E66.813 CLASS 3 SEVERE OBESITY DUE TO EXCESS CALORIES WITHOUT SERIOUS COMORBIDITY WITH BODY MASS INDEX (BMI) OF 40.0 TO 44.9 IN ADULT: ICD-10-CM

## 2025-07-24 DIAGNOSIS — E66.813 CLASS 3 SEVERE OBESITY DUE TO EXCESS CALORIES WITHOUT SERIOUS COMORBIDITY WITH BODY MASS INDEX (BMI) OF 40.0 TO 44.9 IN ADULT: ICD-10-CM

## 2025-08-25 DIAGNOSIS — E66.813 CLASS 3 SEVERE OBESITY DUE TO EXCESS CALORIES WITHOUT SERIOUS COMORBIDITY WITH BODY MASS INDEX (BMI) OF 40.0 TO 44.9 IN ADULT: ICD-10-CM

## (undated) DEVICE — FRCP BX RADJAW4 NDL 2.8 240CM LG OG BX40

## (undated) DEVICE — SOL IRR H2O BTL 1000ML STRL

## (undated) DEVICE — GLV SURG SENSICARE PI MIC PF SZ8 LF STRL

## (undated) DEVICE — VIAL FORMALIN CAP 10P 40ML

## (undated) DEVICE — KT ORCA ORCAPOD DISP STRL

## (undated) DEVICE — LINER SURG CANSTR SXN S/RIGD 1500CC

## (undated) DEVICE — SYR LL W/SCALE/MARK 3ML STRL

## (undated) DEVICE — JACKT LAB F/R KNIT CUFF/COLR XLG BLU

## (undated) DEVICE — BW-412T DISP COMBO CLEANING BRUSH: Brand: SINGLE USE COMBINATION CLEANING BRUSH

## (undated) DEVICE — ADAPT CLN BIOGUARD AIR/H2O DISP

## (undated) DEVICE — Device